# Patient Record
Sex: MALE | Race: WHITE | NOT HISPANIC OR LATINO | Employment: FULL TIME | URBAN - METROPOLITAN AREA
[De-identification: names, ages, dates, MRNs, and addresses within clinical notes are randomized per-mention and may not be internally consistent; named-entity substitution may affect disease eponyms.]

---

## 2019-09-09 ENCOUNTER — ANESTHESIA EVENT (OUTPATIENT)
Dept: PERIOP | Facility: HOSPITAL | Age: 39
DRG: 470 | End: 2019-09-09
Payer: COMMERCIAL

## 2019-09-09 ENCOUNTER — APPOINTMENT (OUTPATIENT)
Dept: LAB | Facility: HOSPITAL | Age: 39
End: 2019-09-09
Attending: ORTHOPAEDIC SURGERY
Payer: COMMERCIAL

## 2019-09-09 ENCOUNTER — APPOINTMENT (OUTPATIENT)
Dept: PREADMISSION TESTING | Facility: HOSPITAL | Age: 39
End: 2019-09-09
Payer: COMMERCIAL

## 2019-09-09 ENCOUNTER — HOSPITAL ENCOUNTER (OUTPATIENT)
Dept: RADIOLOGY | Facility: HOSPITAL | Age: 39
Discharge: HOME | End: 2019-09-09
Attending: ORTHOPAEDIC SURGERY
Payer: COMMERCIAL

## 2019-09-09 ENCOUNTER — HOSPITAL ENCOUNTER (OUTPATIENT)
Dept: NON INVASIVE DIAGNOSTICS | Facility: HOSPITAL | Age: 39
Discharge: HOME | End: 2019-09-09
Attending: ORTHOPAEDIC SURGERY
Payer: COMMERCIAL

## 2019-09-09 ENCOUNTER — TRANSCRIBE ORDERS (OUTPATIENT)
Dept: ADMINISTRATIVE | Facility: HOSPITAL | Age: 39
End: 2019-09-09

## 2019-09-09 DIAGNOSIS — M16.12 PRIMARY OSTEOARTHRITIS OF LEFT HIP: ICD-10-CM

## 2019-09-09 DIAGNOSIS — Z01.818 OTHER SPECIFIED PRE-OPERATIVE EXAMINATION: ICD-10-CM

## 2019-09-09 DIAGNOSIS — M16.12 PRIMARY OSTEOARTHRITIS OF LEFT HIP: Primary | ICD-10-CM

## 2019-09-09 LAB
ALBUMIN SERPL BCP-MCNC: 3.9 G/DL (ref 3.5–5)
ALP SERPL-CCNC: 112 U/L (ref 46–116)
ALT SERPL W P-5'-P-CCNC: 16 U/L (ref 12–78)
ANION GAP SERPL CALCULATED.3IONS-SCNC: 9 MMOL/L (ref 4–13)
AST SERPL W P-5'-P-CCNC: 14 U/L (ref 5–45)
ATRIAL RATE: 66 BPM
BACTERIA UR QL AUTO: ABNORMAL /HPF
BASOPHILS # BLD AUTO: 0.04 THOUSANDS/ΜL (ref 0–0.1)
BASOPHILS NFR BLD AUTO: 1 % (ref 0–1)
BILIRUB SERPL-MCNC: 0.32 MG/DL (ref 0.2–1)
BILIRUB UR QL STRIP: NEGATIVE
BUN SERPL-MCNC: 13 MG/DL (ref 5–25)
CALCIUM SERPL-MCNC: 9.2 MG/DL (ref 8.3–10.1)
CHLORIDE SERPL-SCNC: 103 MMOL/L (ref 100–108)
CLARITY UR: CLEAR
CO2 SERPL-SCNC: 28 MMOL/L (ref 21–32)
COLOR UR: YELLOW
CREAT SERPL-MCNC: 1.15 MG/DL (ref 0.6–1.3)
EOSINOPHIL # BLD AUTO: 0.08 THOUSAND/ΜL (ref 0–0.61)
EOSINOPHIL NFR BLD AUTO: 1 % (ref 0–6)
ERYTHROCYTE [DISTWIDTH] IN BLOOD BY AUTOMATED COUNT: 13.5 % (ref 11.6–15.1)
GFR SERPL CREATININE-BSD FRML MDRD: 79 ML/MIN/1.73SQ M
GLUCOSE P FAST SERPL-MCNC: 88 MG/DL (ref 65–99)
GLUCOSE UR STRIP-MCNC: NEGATIVE MG/DL
HCT VFR BLD AUTO: 43.9 % (ref 36.5–49.3)
HGB BLD-MCNC: 14.4 G/DL (ref 12–17)
HGB UR QL STRIP.AUTO: ABNORMAL
IMM GRANULOCYTES # BLD AUTO: 0.02 THOUSAND/UL (ref 0–0.2)
IMM GRANULOCYTES NFR BLD AUTO: 0 % (ref 0–2)
KETONES UR STRIP-MCNC: NEGATIVE MG/DL
LEUKOCYTE ESTERASE UR QL STRIP: NEGATIVE
LYMPHOCYTES # BLD AUTO: 1.67 THOUSANDS/ΜL (ref 0.6–4.47)
LYMPHOCYTES NFR BLD AUTO: 29 % (ref 14–44)
MCH RBC QN AUTO: 30 PG (ref 26.8–34.3)
MCHC RBC AUTO-ENTMCNC: 32.8 G/DL (ref 31.4–37.4)
MCV RBC AUTO: 92 FL (ref 82–98)
MONOCYTES # BLD AUTO: 0.6 THOUSAND/ΜL (ref 0.17–1.22)
MONOCYTES NFR BLD AUTO: 10 % (ref 4–12)
MUCOUS THREADS UR QL AUTO: ABNORMAL
NEUTROPHILS # BLD AUTO: 3.39 THOUSANDS/ΜL (ref 1.85–7.62)
NEUTS SEG NFR BLD AUTO: 59 % (ref 43–75)
NITRITE UR QL STRIP: NEGATIVE
NON-SQ EPI CELLS URNS QL MICRO: ABNORMAL /HPF
NRBC BLD AUTO-RTO: 0 /100 WBCS
P AXIS: 79 DEGREES
PH UR STRIP.AUTO: 6 [PH]
PLATELET # BLD AUTO: 204 THOUSANDS/UL (ref 149–390)
PMV BLD AUTO: 9.3 FL (ref 8.9–12.7)
POTASSIUM SERPL-SCNC: 4 MMOL/L (ref 3.5–5.3)
PR INTERVAL: 154 MS
PROT SERPL-MCNC: 7.5 G/DL (ref 6.4–8.2)
PROT UR STRIP-MCNC: ABNORMAL MG/DL
QRS AXIS: -44 DEGREES
QRSD INTERVAL: 94 MS
QT INTERVAL: 392 MS
QTC INTERVAL: 410 MS
RBC # BLD AUTO: 4.8 MILLION/UL (ref 3.88–5.62)
RBC #/AREA URNS AUTO: ABNORMAL /HPF
SODIUM SERPL-SCNC: 140 MMOL/L (ref 136–145)
SP GR UR STRIP.AUTO: >=1.03 (ref 1–1.03)
T WAVE AXIS: 58 DEGREES
UROBILINOGEN UR QL STRIP.AUTO: 0.2 E.U./DL
VENTRICULAR RATE: 66 BPM
WBC # BLD AUTO: 5.8 THOUSAND/UL (ref 4.31–10.16)
WBC #/AREA URNS AUTO: ABNORMAL /HPF

## 2019-09-09 PROCEDURE — 36415 COLL VENOUS BLD VENIPUNCTURE: CPT

## 2019-09-09 PROCEDURE — 93010 ELECTROCARDIOGRAM REPORT: CPT | Performed by: INTERNAL MEDICINE

## 2019-09-09 PROCEDURE — 81001 URINALYSIS AUTO W/SCOPE: CPT | Performed by: ORTHOPAEDIC SURGERY

## 2019-09-09 PROCEDURE — 71046 X-RAY EXAM CHEST 2 VIEWS: CPT

## 2019-09-09 PROCEDURE — 93005 ELECTROCARDIOGRAM TRACING: CPT

## 2019-09-09 PROCEDURE — 85025 COMPLETE CBC W/AUTO DIFF WBC: CPT

## 2019-09-09 PROCEDURE — 80053 COMPREHEN METABOLIC PANEL: CPT

## 2019-09-09 RX ORDER — IBUPROFEN 800 MG/1
800 TABLET ORAL EVERY 8 HOURS PRN
COMMUNITY
End: 2019-10-06 | Stop reason: HOSPADM

## 2019-09-09 RX ORDER — SODIUM CHLORIDE 9 MG/ML
125 INJECTION, SOLUTION INTRAVENOUS CONTINUOUS
Status: CANCELLED | OUTPATIENT
Start: 2019-10-04

## 2019-09-09 NOTE — ANESTHESIA PREPROCEDURE EVALUATION
Review of Systems/Medical History  Patient summary reviewed  Chart reviewed      Cardiovascular  Negative cardio ROS    Pulmonary  Smoker cigarette smoker  ,        GI/Hepatic    PUD, Hepatitis C,   Comment: Hep C in remission     Negative  ROS        Endo/Other  Negative endo/other ROS      GYN  Negative gynecology ROS          Hematology   Musculoskeletal    Arthritis     Neurology  Negative neurology ROS      Psychology   Negative psychology ROS              Physical Exam    Airway    Mallampati score: II  TM Distance: >3 FB  Neck ROM: full     Dental   No notable dental hx     Cardiovascular  Comment: Negative ROS, Rhythm: regular, Rate: normal, Cardiovascular exam normal    Pulmonary  Pulmonary exam normal Breath sounds clear to auscultation,     Other Findings        Anesthesia Plan  ASA Score- 2     Anesthesia Type- spinal with ASA Monitors  Additional Monitors:   Airway Plan:         Plan Factors-    Induction-     Postoperative Plan-     Informed Consent- Anesthetic plan and risks discussed with patient

## 2019-09-09 NOTE — PRE-PROCEDURE INSTRUCTIONS
Pre-Surgery Instructions:   Medication Instructions    HYDROcodone-acetaminophen (NORCO) 5-325 mg per tablet Patient was instructed by Physician and understands   ibuprofen (MOTRIN) 800 mg tablet Patient was instructed by Physician and understands  Seen by Dr Fredy Diez  Instructed has no medications to take morning of surgery  No aspiein, NSAIDs, vitamins, or supplements 1 week begors surgery

## 2019-10-04 ENCOUNTER — HOSPITAL ENCOUNTER (OUTPATIENT)
Dept: RADIOLOGY | Facility: HOSPITAL | Age: 39
Setting detail: SURGERY ADMIT
Discharge: HOME | DRG: 470 | End: 2019-10-04
Payer: COMMERCIAL

## 2019-10-04 ENCOUNTER — APPOINTMENT (INPATIENT)
Dept: RADIOLOGY | Facility: HOSPITAL | Age: 39
DRG: 470 | End: 2019-10-04
Payer: COMMERCIAL

## 2019-10-04 ENCOUNTER — ANESTHESIA (OUTPATIENT)
Dept: PERIOP | Facility: HOSPITAL | Age: 39
DRG: 470 | End: 2019-10-04
Payer: COMMERCIAL

## 2019-10-04 ENCOUNTER — HOSPITAL ENCOUNTER (INPATIENT)
Facility: HOSPITAL | Age: 39
LOS: 2 days | Discharge: HOME | DRG: 470 | End: 2019-10-06
Attending: ORTHOPAEDIC SURGERY | Admitting: ORTHOPAEDIC SURGERY
Payer: COMMERCIAL

## 2019-10-04 DIAGNOSIS — M16.12 PRIMARY OSTEOARTHRITIS OF LEFT HIP: Primary | ICD-10-CM

## 2019-10-04 DIAGNOSIS — M16.12 PRIMARY OSTEOARTHRITIS OF LEFT HIP: ICD-10-CM

## 2019-10-04 LAB
ABO GROUP BLD: NORMAL
BLD GP AB SCN SERPL QL: NEGATIVE
RH BLD: NEGATIVE
SPECIMEN EXPIRATION DATE: NORMAL

## 2019-10-04 PROCEDURE — G8988 SELF CARE GOAL STATUS: HCPCS

## 2019-10-04 PROCEDURE — 88304 TISSUE EXAM BY PATHOLOGIST: CPT | Performed by: PATHOLOGY

## 2019-10-04 PROCEDURE — C1776 JOINT DEVICE (IMPLANTABLE): HCPCS | Performed by: ORTHOPAEDIC SURGERY

## 2019-10-04 PROCEDURE — C1713 ANCHOR/SCREW BN/BN,TIS/BN: HCPCS | Performed by: ORTHOPAEDIC SURGERY

## 2019-10-04 PROCEDURE — G8978 MOBILITY CURRENT STATUS: HCPCS

## 2019-10-04 PROCEDURE — 88311 DECALCIFY TISSUE: CPT | Performed by: PATHOLOGY

## 2019-10-04 PROCEDURE — 86850 RBC ANTIBODY SCREEN: CPT | Performed by: ORTHOPAEDIC SURGERY

## 2019-10-04 PROCEDURE — 86901 BLOOD TYPING SEROLOGIC RH(D): CPT | Performed by: ORTHOPAEDIC SURGERY

## 2019-10-04 PROCEDURE — 0SRB0JA REPLACEMENT OF LEFT HIP JOINT WITH SYNTHETIC SUBSTITUTE, UNCEMENTED, OPEN APPROACH: ICD-10-PCS | Performed by: ORTHOPAEDIC SURGERY

## 2019-10-04 PROCEDURE — 73502 X-RAY EXAM HIP UNI 2-3 VIEWS: CPT

## 2019-10-04 PROCEDURE — G8979 MOBILITY GOAL STATUS: HCPCS

## 2019-10-04 PROCEDURE — 97163 PT EVAL HIGH COMPLEX 45 MIN: CPT

## 2019-10-04 PROCEDURE — 86900 BLOOD TYPING SEROLOGIC ABO: CPT | Performed by: ORTHOPAEDIC SURGERY

## 2019-10-04 PROCEDURE — G8987 SELF CARE CURRENT STATUS: HCPCS

## 2019-10-04 PROCEDURE — 97167 OT EVAL HIGH COMPLEX 60 MIN: CPT

## 2019-10-04 PROCEDURE — 73501 X-RAY EXAM HIP UNI 1 VIEW: CPT

## 2019-10-04 DEVICE — BIOLOX DELTA CERAMIC FEMORAL HEAD +5.0 36MM DIA 12/14 TAPER
Type: IMPLANTABLE DEVICE | Site: HIP | Status: FUNCTIONAL
Brand: BIOLOX DELTA

## 2019-10-04 DEVICE — PINNACLE CANCELLOUS BONE SCREW 6.5MM X 20MM
Type: IMPLANTABLE DEVICE | Site: HIP | Status: FUNCTIONAL
Brand: PINNACLE

## 2019-10-04 DEVICE — PINNACLE HIP SOLUTIONS ALTRX POLYETHYLENE ACETABULAR LINER +4 NEUTRAL 36MM ID 60MM OD
Type: IMPLANTABLE DEVICE | Site: HIP | Status: FUNCTIONAL
Brand: PINNACLE ALTRX

## 2019-10-04 DEVICE — PINNACLE POROCOAT ACETABULAR SHELL SECTOR II 60MM OD
Type: IMPLANTABLE DEVICE | Site: HIP | Status: FUNCTIONAL
Brand: PINNACLE POROCOAT

## 2019-10-04 DEVICE — CORAIL HIP SYSTEM CEMENTLESS FEMORAL STEM 12/14 AMT 125 DEGREES KLA SIZE 13 HA COATED HIGH OFFSET COLLAR
Type: IMPLANTABLE DEVICE | Site: HIP | Status: FUNCTIONAL
Brand: CORAIL

## 2019-10-04 RX ORDER — CALCIUM CARBONATE 200(500)MG
1000 TABLET,CHEWABLE ORAL DAILY PRN
Status: DISCONTINUED | OUTPATIENT
Start: 2019-10-04 | End: 2019-10-06 | Stop reason: HOSPADM

## 2019-10-04 RX ORDER — ONDANSETRON 2 MG/ML
INJECTION INTRAMUSCULAR; INTRAVENOUS AS NEEDED
Status: DISCONTINUED | OUTPATIENT
Start: 2019-10-04 | End: 2019-10-04 | Stop reason: SURG

## 2019-10-04 RX ORDER — TRANEXAMIC ACID 100 MG/ML
INJECTION, SOLUTION INTRAVENOUS AS NEEDED
Status: DISCONTINUED | OUTPATIENT
Start: 2019-10-04 | End: 2019-10-04 | Stop reason: SURG

## 2019-10-04 RX ORDER — BUPIVACAINE HYDROCHLORIDE AND EPINEPHRINE 2.5; 5 MG/ML; UG/ML
INJECTION, SOLUTION INFILTRATION; PERINEURAL AS NEEDED
Status: DISCONTINUED | OUTPATIENT
Start: 2019-10-04 | End: 2019-10-04 | Stop reason: HOSPADM

## 2019-10-04 RX ORDER — PROPOFOL 10 MG/ML
INJECTION, EMULSION INTRAVENOUS CONTINUOUS PRN
Status: DISCONTINUED | OUTPATIENT
Start: 2019-10-04 | End: 2019-10-04 | Stop reason: SURG

## 2019-10-04 RX ORDER — MAGNESIUM HYDROXIDE 1200 MG/15ML
LIQUID ORAL AS NEEDED
Status: DISCONTINUED | OUTPATIENT
Start: 2019-10-04 | End: 2019-10-04 | Stop reason: HOSPADM

## 2019-10-04 RX ORDER — SODIUM CHLORIDE 9 MG/ML
125 INJECTION, SOLUTION INTRAVENOUS CONTINUOUS
Status: DISCONTINUED | OUTPATIENT
Start: 2019-10-04 | End: 2019-10-06 | Stop reason: HOSPADM

## 2019-10-04 RX ORDER — SIMETHICONE 80 MG
80 TABLET,CHEWABLE ORAL 4 TIMES DAILY PRN
Status: DISCONTINUED | OUTPATIENT
Start: 2019-10-04 | End: 2019-10-06 | Stop reason: HOSPADM

## 2019-10-04 RX ORDER — HYDROMORPHONE HCL/PF 1 MG/ML
0.2 SYRINGE (ML) INJECTION
Status: DISCONTINUED | OUTPATIENT
Start: 2019-10-04 | End: 2019-10-06 | Stop reason: HOSPADM

## 2019-10-04 RX ORDER — SODIUM CHLORIDE, SODIUM LACTATE, POTASSIUM CHLORIDE, CALCIUM CHLORIDE 600; 310; 30; 20 MG/100ML; MG/100ML; MG/100ML; MG/100ML
125 INJECTION, SOLUTION INTRAVENOUS CONTINUOUS
Status: DISCONTINUED | OUTPATIENT
Start: 2019-10-04 | End: 2019-10-06 | Stop reason: HOSPADM

## 2019-10-04 RX ORDER — CEFAZOLIN SODIUM 1 G/50ML
1000 SOLUTION INTRAVENOUS EVERY 8 HOURS
Status: COMPLETED | OUTPATIENT
Start: 2019-10-04 | End: 2019-10-05

## 2019-10-04 RX ORDER — HYDROMORPHONE HCL/PF 1 MG/ML
0.5 SYRINGE (ML) INJECTION
Status: DISCONTINUED | OUTPATIENT
Start: 2019-10-04 | End: 2019-10-04 | Stop reason: HOSPADM

## 2019-10-04 RX ORDER — OXYCODONE HYDROCHLORIDE 10 MG/1
10 TABLET ORAL EVERY 4 HOURS PRN
Status: DISCONTINUED | OUTPATIENT
Start: 2019-10-04 | End: 2019-10-06 | Stop reason: HOSPADM

## 2019-10-04 RX ORDER — CEFAZOLIN SODIUM 2 G/50ML
2000 SOLUTION INTRAVENOUS ONCE
Status: COMPLETED | OUTPATIENT
Start: 2019-10-04 | End: 2019-10-04

## 2019-10-04 RX ORDER — FENTANYL CITRATE/PF 50 MCG/ML
25 SYRINGE (ML) INJECTION
Status: DISCONTINUED | OUTPATIENT
Start: 2019-10-04 | End: 2019-10-04 | Stop reason: HOSPADM

## 2019-10-04 RX ORDER — FENTANYL CITRATE 50 UG/ML
INJECTION, SOLUTION INTRAMUSCULAR; INTRAVENOUS AS NEEDED
Status: DISCONTINUED | OUTPATIENT
Start: 2019-10-04 | End: 2019-10-04 | Stop reason: SURG

## 2019-10-04 RX ORDER — ASPIRIN 81 MG/1
81 TABLET ORAL 2 TIMES DAILY
Status: DISCONTINUED | OUTPATIENT
Start: 2019-10-04 | End: 2019-10-06 | Stop reason: HOSPADM

## 2019-10-04 RX ORDER — BUPIVACAINE HYDROCHLORIDE 7.5 MG/ML
INJECTION, SOLUTION INTRASPINAL AS NEEDED
Status: DISCONTINUED | OUTPATIENT
Start: 2019-10-04 | End: 2019-10-04 | Stop reason: SURG

## 2019-10-04 RX ORDER — TRAMADOL HYDROCHLORIDE 50 MG/1
50 TABLET ORAL EVERY 6 HOURS PRN
Status: DISCONTINUED | OUTPATIENT
Start: 2019-10-04 | End: 2019-10-06 | Stop reason: HOSPADM

## 2019-10-04 RX ORDER — PANTOPRAZOLE SODIUM 40 MG/1
40 TABLET, DELAYED RELEASE ORAL
Status: DISCONTINUED | OUTPATIENT
Start: 2019-10-05 | End: 2019-10-06 | Stop reason: HOSPADM

## 2019-10-04 RX ORDER — ACETAMINOPHEN 325 MG/1
650 TABLET ORAL EVERY 6 HOURS PRN
Status: DISCONTINUED | OUTPATIENT
Start: 2019-10-04 | End: 2019-10-06 | Stop reason: HOSPADM

## 2019-10-04 RX ORDER — MIDAZOLAM HYDROCHLORIDE 1 MG/ML
INJECTION INTRAMUSCULAR; INTRAVENOUS AS NEEDED
Status: DISCONTINUED | OUTPATIENT
Start: 2019-10-04 | End: 2019-10-04 | Stop reason: SURG

## 2019-10-04 RX ORDER — METHOCARBAMOL 500 MG/1
500 TABLET, FILM COATED ORAL EVERY 6 HOURS PRN
Status: DISCONTINUED | OUTPATIENT
Start: 2019-10-04 | End: 2019-10-06 | Stop reason: HOSPADM

## 2019-10-04 RX ORDER — ONDANSETRON 2 MG/ML
4 INJECTION INTRAMUSCULAR; INTRAVENOUS EVERY 6 HOURS PRN
Status: DISCONTINUED | OUTPATIENT
Start: 2019-10-04 | End: 2019-10-06 | Stop reason: HOSPADM

## 2019-10-04 RX ORDER — ONDANSETRON 2 MG/ML
4 INJECTION INTRAMUSCULAR; INTRAVENOUS ONCE
Status: DISCONTINUED | OUTPATIENT
Start: 2019-10-04 | End: 2019-10-04 | Stop reason: HOSPADM

## 2019-10-04 RX ORDER — OXYCODONE HYDROCHLORIDE 5 MG/1
5 TABLET ORAL EVERY 4 HOURS PRN
Status: DISCONTINUED | OUTPATIENT
Start: 2019-10-04 | End: 2019-10-06 | Stop reason: HOSPADM

## 2019-10-04 RX ORDER — VANCOMYCIN HYDROCHLORIDE 1 G/200ML
1000 INJECTION, SOLUTION INTRAVENOUS ONCE
Status: COMPLETED | OUTPATIENT
Start: 2019-10-04 | End: 2019-10-04

## 2019-10-04 RX ORDER — SENNOSIDES 8.6 MG
1 TABLET ORAL DAILY
Status: DISCONTINUED | OUTPATIENT
Start: 2019-10-04 | End: 2019-10-06 | Stop reason: HOSPADM

## 2019-10-04 RX ADMIN — PROPOFOL 100 MCG/KG/MIN: 10 INJECTION, EMULSION INTRAVENOUS at 10:47

## 2019-10-04 RX ADMIN — SODIUM CHLORIDE, SODIUM LACTATE, POTASSIUM CHLORIDE, AND CALCIUM CHLORIDE 125 ML/HR: .6; .31; .03; .02 INJECTION, SOLUTION INTRAVENOUS at 22:17

## 2019-10-04 RX ADMIN — SODIUM CHLORIDE: 0.9 INJECTION, SOLUTION INTRAVENOUS at 10:50

## 2019-10-04 RX ADMIN — TRANEXAMIC ACID 1 G: 1 INJECTION, SOLUTION INTRAVENOUS at 11:50

## 2019-10-04 RX ADMIN — SODIUM CHLORIDE 125 ML/HR: 0.9 INJECTION, SOLUTION INTRAVENOUS at 09:47

## 2019-10-04 RX ADMIN — OXYCODONE HYDROCHLORIDE 5 MG: 5 TABLET ORAL at 20:05

## 2019-10-04 RX ADMIN — TRAMADOL HYDROCHLORIDE 50 MG: 50 TABLET, FILM COATED ORAL at 16:58

## 2019-10-04 RX ADMIN — CEFAZOLIN SODIUM 2000 MG: 2 SOLUTION INTRAVENOUS at 10:45

## 2019-10-04 RX ADMIN — ASPIRIN 81 MG: 81 TABLET, COATED ORAL at 22:11

## 2019-10-04 RX ADMIN — HYDROMORPHONE HYDROCHLORIDE 0.2 MG: 1 INJECTION, SOLUTION INTRAMUSCULAR; INTRAVENOUS; SUBCUTANEOUS at 15:26

## 2019-10-04 RX ADMIN — TRANEXAMIC ACID 1 G: 1 INJECTION, SOLUTION INTRAVENOUS at 10:36

## 2019-10-04 RX ADMIN — BUPIVACAINE HYDROCHLORIDE IN DEXTROSE 2 ML: 7.5 INJECTION, SOLUTION SUBARACHNOID at 10:33

## 2019-10-04 RX ADMIN — SODIUM CHLORIDE, SODIUM LACTATE, POTASSIUM CHLORIDE, AND CALCIUM CHLORIDE 125 ML/HR: .6; .31; .03; .02 INJECTION, SOLUTION INTRAVENOUS at 14:00

## 2019-10-04 RX ADMIN — ONDANSETRON 4 MG: 2 INJECTION INTRAMUSCULAR; INTRAVENOUS at 11:56

## 2019-10-04 RX ADMIN — MIDAZOLAM 4 MG: 1 INJECTION INTRAMUSCULAR; INTRAVENOUS at 10:24

## 2019-10-04 RX ADMIN — CEFAZOLIN SODIUM 1000 MG: 1 SOLUTION INTRAVENOUS at 18:30

## 2019-10-04 RX ADMIN — METHOCARBAMOL 500 MG: 500 TABLET, FILM COATED ORAL at 16:58

## 2019-10-04 RX ADMIN — VANCOMYCIN HYDROCHLORIDE 1063.75 MG: 1 INJECTION, SOLUTION INTRAVENOUS at 10:36

## 2019-10-04 RX ADMIN — SODIUM CHLORIDE 125 ML/HR: 0.9 INJECTION, SOLUTION INTRAVENOUS at 08:34

## 2019-10-04 RX ADMIN — FENTANYL CITRATE 100 MCG: 50 INJECTION, SOLUTION INTRAMUSCULAR; INTRAVENOUS at 10:24

## 2019-10-04 RX ADMIN — OXYCODONE HYDROCHLORIDE 10 MG: 10 TABLET ORAL at 14:48

## 2019-10-04 RX ADMIN — HYDROMORPHONE HYDROCHLORIDE 0.2 MG: 1 INJECTION, SOLUTION INTRAMUSCULAR; INTRAVENOUS; SUBCUTANEOUS at 21:07

## 2019-10-04 NOTE — INTERVAL H&P NOTE
H&P reviewed  After examining the patient I find no changes in the patients condition since the H&P had been written      Vitals:    09/09/19 0909   BP: 145/90   Pulse: 82   Resp: 14   Temp: 98 3 °F (36 8 °C)

## 2019-10-04 NOTE — ANESTHESIA PROCEDURE NOTES
Spinal Block    Patient location during procedure: OR  Start time: 10/4/2019 10:33 AM  Reason for block: primary anesthetic  Staffing  Anesthesiologist: Hetal Bergman DO  Performed: anesthesiologist   Preanesthetic Checklist  Completed: patient identified, site marked, surgical consent, pre-op evaluation, timeout performed, IV checked, risks and benefits discussed and monitors and equipment checked  Spinal Block  Patient position: sitting  Prep: Betadine  Patient monitoring: heart rate and continuous pulse ox  Approach: midline  Location: L3-4  Injection technique: single-shot  Needle  Needle type: pencil-tip   Needle gauge: 25 G  Needle length: 5 cm  Assessment  Injection Assessment:  positive aspiration for clear CSF    Post-procedure:  site cleaned

## 2019-10-04 NOTE — PLAN OF CARE
Problem: OCCUPATIONAL THERAPY ADULT  Goal: Performs self-care activities at highest level of function for planned discharge setting  See evaluation for individualized goals  Description  Treatment Interventions: ADL retraining, Functional transfer training, UE strengthening/ROM, Endurance training, Patient/family training, Equipment evaluation/education, Compensatory technique education          See flowsheet documentation for full assessment, interventions and recommendations  Note:   Limitation: Decreased ADL status, Decreased Safe judgement during ADL, Decreased endurance  Prognosis: Good  Assessment: Pt is a 40y/o male admitted to the hospital for an elected L THR(10/4)--anterior approach(no restrictions) 2* hx OA  Pt is currently allowed WBAT with his L LE  Pt with PMH multiple orthopedic interventions--R TSR, L TKR, R hip arthroplasty  PTA pt states independence with all aspects of his ADLs, transfers, ambulation--w/o device; neg falls, +, neg home alone  During initial eval, pt demonstrate deficits with his functional balance, functional mobility, ADL status, activity tolerance(currently fair=15-20mins), transfer safety, and b/l UE strength  Pt would benefit from continued OT tx for the above deficits  1-5 OT tx sessions     OT Discharge Recommendation: (outpt P T  )  OT - OK to Discharge:  Yes

## 2019-10-04 NOTE — DISCHARGE INSTRUCTIONS
[facility]   Post Operative Joint Replacement Instructions   Dr Chele Harvey Office [address] [place] [phone]     Remove dressing post op day 7  Home Health care will remove the staples/sutures post op day 14  MEDICATIONS      *  Enteric Coated Aspirin 81 mg:  Start taking this twice daily on discharge and continue for 6 weeks  Continue as you were in the hospital/rehab facility     * Iron: Continue the iron supplement twice daily  If you experience nausea or constipation with the medicine discontinue its use  Contact us if the nausea persists  * Pain Medications: Your prescriptions may run out before you return for your next visit  Please give us two regular office day's notice before you run out, to prevent any problems of not having pain medicine  Be advised that prescriptions for Percocet and OxyContin cannot be phoned to your pharmacy  They will need to be picked up at our office  Please refrain from using alcohol with your pain medicines  Percocet contains 325 mg of Tylenol  You should not exceed 3000 mg of Tylenol in a day  Please be careful to not overdose the Tylenol  * Herbal Medicines: Please hold all these preparations until after your first post-operative visit  Unless specifically directed otherwise  ACTIVITY   * Your weight bearing status is: as tolerated     * If you have been furnished with an assistive device  Please feel free to try to wean from using it under the supervision of your therapist      * You may participate in activity as tolerated  It is likely that you will tire more easily for a time after surgery  Please rest when you need it to help your healing process  * Stairs are not restricted for you  Please climb stairs as instructed by your physical therapist      * For hip and knee replacement patients please elevate your leg or legs while resting  This is important to prevent lower leg swelling       * When you are back at home you will likely have physical therapy three times a week  On the days you do not have formal therapy please perform the home exercises given to you  * If you had a hip replacement, please follow the safety precautions given to you by the nurse or therapist taking care of you  * Immediately following the surgery you are not permitted to drive until cleared by your surgeon  This typically does not happen until your first visit after surgery  * Knee replacement patients may be passengers in a vehicle following their discharge from the hospital      * Hip replacement patients are not allowed in a vehicle, except for your trip home and your first follow up visit  Please follow these guidelines unless specifically instructed to start outpatient therapy at an earlier time  * Please do not perform lifting tasks or strenuous domestic activities  * If you currently are employed, you are off work until cleared by your surgeon  Everyone's ability to return to work is determined by his or her abilities  Your return to work may take a few weeks to a few months  * Sexual activities are permitted as tolerated  NORMAL FINDINGS   * Numbness along the incision     * Mechanical click of a knee replacement  * Your incision area will feel warm  WOUND CARE   * It is OK to shower once there is no spotting or drainage for a full 24 hours  Please do not submerge the incision into a pool, bath or hot tub until after your 1st post-operative visit  * Please do not disturb your staples, sutures, or the scabs  It promotes better healing and smaller scars  * Cover the incision with gauze dressing until there is no further drainage  * You may leave the incision open to the air when there is no discharge left on the gauze at changing time  Once this occurs you may shower  * Please be generous with the use of ice  It is a very good remedy  Apply ice for only twenty minutes at a time   You may use it every hour  It is recommended to ice before and after anticipated activities, which includes exercise and physical therapy  * Wear your knee-high pressure stockings every day  They may be removed for sleep at night  Continue to wear them until you are seen for your first follow up  * For knee and hip replacements; your staples will be removed about 14 days after your surgery date  Home nurses and physical therapists typically remove them  If they are unable to, please call our office to have us do it for you  FOLLOW UP   * You should have a scheduled visit with your surgeon scheduled for three to four weeks after surgery  If you do not remember your appointment date and time, or if you are sure you do not have one, please call to set one up  * Please inform the office if you experience one of the following:    - Fever that is not responding to Tylenol and is above 101 degrees    - Redness of the skin that is increasing in area    - Your incision is soaking the dressings with drainage or blood    - You're unable to bear weight on your operative leg or legs

## 2019-10-04 NOTE — PHYSICAL THERAPY NOTE
Physical Therapy Evaluation    Patient Name: Linda Weeks    TXEVH'X Date: 10/4/2019     Problem List  Active Problems:    * No active hospital problems  *       Past Medical History  Past Medical History:   Diagnosis Date    DDD (degenerative disc disease), lumbar     Disc disorder of lumbar region     History of peptic ulcer     Infectious viral hepatitis     Hep C in "remission since 2004"    Osteonecrosis of multiple sites [facility])         Past Surgical History  Past Surgical History:   Procedure Laterality Date    APPENDECTOMY      HIP ARTHROPLASTY Right     x2    LUMBAR EPIDURAL INJECTION      FL TOTAL KNEE ARTHROPLASTY Left 11/30/2016    Procedure: ARTHROPLASTY KNEE TOTAL;  Surgeon: Javier Thacker MD;  Location: [facility];  Service: Orthopedics    SHOULDER SURGERY Right     total replacement    WISDOM TOOTH EXTRACTION             10/04/19 1511   Note Type   Note type Eval only   Pain Assessment   Pain Assessment 0-10   Pain Score 6   Pain Type Acute pain;Surgical pain   Pain Location Hip   Pain Orientation Left   Hospital Pain Intervention(s) Repositioned; Ambulation/increased activity;Cold applied   Response to Interventions tolerated   Home Living   Type of [address] One level;Stairs to enter with rails  (2 [address])   Bathroom Shower/Tub Tub/shower unit   [employer] Raised   Bathroom Equipment Grab bars around toilet   Home Equipment Walker;Cane;Wheelchair-manual   Prior Function   Level of [place] Independent with ADLs and functional mobility   Lives With [employer] [facility], [place] in the last 6 months 0   Vocational Full time employment   Restrictions/Precautions   Weight Bearing Precautions Per Order Yes   LLE Weight Bearing Per Order WBAT   Other Precautions WBS;THR;Multiple lines; Fall Risk;Pain   General   Family/Caregiver Present Yes  (pt daughter and significant other)   Cognition   Overall Cognitive Status WFL   Arousal/Participation Cooperative   Orientation Level Oriented X4   Following Commands Follows one step commands without difficulty   RUE Assessment   RUE Assessment WFL   LUE Assessment   LUE Assessment WFL   RLE Assessment   RLE Assessment WFL   LLE Assessment   LLE Assessment X   Strength LLE   LLE Overall Strength 2+/5  (grossly)   Coordination   Movements are Fluid and Coordinated 0   Coordination and Movement Description slow movements, guarded posture secondary to pain   Bed Mobility   Supine to Sit 4  Minimal assistance   Additional items Increased time required;Verbal cues;LE management   Additional Comments Pt left sitting in bedside chair at termination of session, all needs within reach   Transfers   Sit to Stand 4  Minimal assistance   Additional items Increased time required;Verbal cues   Stand to Sit 4  Minimal assistance   Additional items Increased time required;Verbal cues   Ambulation/Elevation   Gait pattern Antalgic;Narrow BHUPENDRA; Decreased foot clearance;Decreased L stance; Short stride; Step to;Excessively slow   Gait Assistance 4  Minimal assist   Additional items Verbal cues; Tactile cues   Assistive Device Rolling walker   Distance 5' from bed to bedside chair   Balance   Static Sitting Good   Dynamic Sitting Fair +   Static Standing Fair  (RW)   Dynamic Standing Fair -  (RW)   Ambulatory Fair -  (RW)   Endurance Deficit   Endurance Deficit Yes   Endurance Deficit Description weakness, fatigue, pain   Activity Tolerance   Activity Tolerance Patient limited by fatigue;Patient limited by pain   Medical Staff Made Aware OT, [address]   Nurse Made Aware RN cleared patient for PT evaluation   Assessment   Prognosis Good   Problem List Decreased strength;Decreased range of motion;Decreased endurance; Impaired balance;Decreased mobility; Decreased coordination;Decreased skin integrity;Orthopedic restrictions;Pain   Assessment Pt is a 37 y o  male seen for PT evaluation s/p admit to [facility] on 10/4/19  Two pt identifiers were used to confirm  Pt presented s/p L MATTY which was performed on 10/4/19  Pt was admitted with a primary dx of: primary osteoarthritis of left hip  PT now consulted for assessment of mobility and d/c needs  Pts current co morbidities effecting treatment include: ambulatory dysfunction and personal factors including 2 KAYE home environment  Pt currently lives in an apartment with his family  Pts current clinical presentation is Unstable/ Unpredictable (high complexity) due to Ongoing medical management for primary dx, Increased reliance on more restrictive AD compared to baseline, decreased activity tolerance compared to baseline, fall risk, increased assistance needed from caregiver at current time, continuous pulse oximetry monitoring, multiple lines, decline in overall functional mobility status  Prior to admission, pt was I with ambulation without the use of an AD as per pt  Upon evaluation, pt currently is requiring min A for bed mobility; min A for transfers and min A for ambulation w/ RW  Pt displays decreased step and stride length, decreased gait speed, improper weight shift  Pt presents at PT eval functioning below baseline and currently w/ overall mobility deficits secondary to: decreased strength, decreased endurance, impaired balance, impaired coordination, pain  Pt currently at a fall risk secondary to impairments listed above  Based on PT evaluation, pt will continue to benefit from skilled acute PT interventions to address stated impairments; to maximize functional mobility; for ongoing pt/ family training; and DME needs  At conclusion of PT session pt was left seated in bedside chair, all needs within reach  Provided pt education regarding PT plan to improve functional mobility status  PT is currently recommending home with family support and outpatient PT    Pt agreeable to plan and goals as stated on evaluation  PT will continue to follow during hospital stay  Goals   Patient Goals go home tomorrow   STG Expiration Date 10/18/19   Short Term Goal #1 1  Pt will increased strength by 1 grade in order to increase functional independence with transfers  2  Pt will increase balance grade by 1 in order to improve safety  3  Pt will improve transfer ability to mod I to increase functional independence and decrease caregiver burden  4  Pt will perform bed mobility independently to decrease caregiver burden  5  Pt will be able to amb 150 ft with RW and mod I to increase functional independence in home and community environments  6  Pt will be able to ascend and descend 2 stairs mod I to increase functional independence within the home environment  7  Pt will be independent with HEP  PT Treatment Day 0   Plan   Treatment/Interventions Functional transfer training;LE strengthening/ROM; Elevations; Therapeutic exercise; Endurance training;Patient/family training;Equipment eval/education; Bed mobility;Gait training; Compensatory technique education;Spoke to nursing;OT;Family   PT Frequency 7x/wk; Twice a day   Recommendation   Recommendation Home with family support; Outpatient PT   Equipment Recommended Yoon [place]  (pt reports he owns RW)   PT - OK to Discharge No   Additional Comments increase ambulation distance and clear stairs   Modified [place] Scale   Modified [place] Scale 4   Barthel Index   Feeding 10   Bathing 0   Grooming Score 5   Dressing Score 5   Bladder Score 0   Bowels Score 10   Toilet Use Score 5   Transfers (Bed/Chair) Score 10   Mobility (Level Surface) Score 0   Stairs Score 0   Barthel Index Score 45       Prince Turcios PT, DPT

## 2019-10-04 NOTE — DISCHARGE SUMMARY
DISCHARGE SUMMARY      Patient Name: Elvis Mulligan  Patient MRN: [id number]  Admitting Provider: Poonam Donohue MD  Discharging Provider: Poonam Donohue MD  Primary Care Physician at Discharge: Milagros Garces [place] [phone]  Admission Date: 10/4/2019       Discharge Date: 10/5/19    Admission Diagnosis  Primary osteoarthritis of left hip [M16 12]    Discharge Diagnoses  [facility]  Elvis Mulligan was admitted to [facility] on 10/4/2019, with diagnosis of osteoarthritis in his Left hip  On the day of admission, he was brought to surgery, successfully underwent a Left hip replacement  He tolerated the procedure well  Following surgery, he was taken to the recovery room, at which time, there was a consult placed for Dr Ruby Morales for the patient's medical management  After a short stay in the recovery room, he was transferred to the orthopedic floor at which time, he was resumed on his routine home medications per the hospitalist group  On postop day #1, he was able to start some physical therapy and was able to tolerate it well  At this time, he was in stable condition, showing no sign of deep vein thrombosis or pulmonary embolism  Incision was healing well at the time and showed no signs of infection  DISCHARGE DIAGNOSIS: Primary osteoarthritis of left hip [M16 12]  He is now status post Left total hip replacement, which he underwent during the hospital stay  Medications  See after visit summary for reconciled discharge medications provided to patient and family  Allergies  Allergies   Allergen Reactions    Meloxicam Anaphylaxis       Outpatient Follow-Up  No future appointments      Discharge Disposition  Home GOOD CONDITION    Operative Procedures Performed  Procedure(s):  ARTHROPLASTY HIP TOTAL ANTERIOR        Jay Atkinson PA-C    DISCHARGE SUMMARY

## 2019-10-04 NOTE — CONSULTS
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     Recommendation: -    Patient is hemodynamically stable, continues to recuperate well following the surgery  Continue IV fluids, incentive spirometry, DVT prophylaxis with pharmacotherapy and mechanical compression boots  Reviewed analgesics with him   P r n  Zofran for any nausea vomiting  Discontinue IV fluids and Baldwin catheter within next 24-48 hours  Maintain Intravenous Fluids for next 24 -48 hours, till patient able to reliably keep meals and meds in  Suggest  Zofran ODT 4 mg sublingually PRN for control of post operative nausea  Patient encouraged to  use Incentive spirometry q 15 minutes while awake to minimize risk of postoperative atelectasis and pneumonia  Patient verbalized to understand and fully comprehends  We will follow this pleasant patient with your service closely and recommend necessary changes based on  further hospital course and diagnostics  Thank you, Dr Kristen Olvera , for your kind consultation  HISTORY OF PRESENT ILLNESS:    Reason for Consult:  Post operative management following elective left hip replacement    HPI: Modesto Juarez is a 37 y o  male is a  who has history of having arthralgias and joint pains related to osteonecrosis/Djd for which he has been followed by his primary care physician and by orthopedic team   He had been having increasing pain in his left hip for more than a year which was unamenable to pain medications and conservative management  He was having difficulty working as a     He was noted to have crepitus even with passive range of motion and simple activities were making his left hip pain is worse  His ADLs and quality of life was limited  Patient had previous hip replacement in 2010 followed by revision for loosening in 2012  At that time he was told that he had avascular necrosis  Patient has some family history where he states that his children including his son and daughter have had same issues and they required some bone grafting and were seen by a  at [facility], Dr Yesenia Leavitt, who told him there could be a genetic component but it could not be determined what specific gene it was  Patient also had a left total knee replacement in November of 2016 by Dr Tenzin Gonzalez with excellent relief  Patient began to have pain earlier this year and wanted to get surgery done for his constant throbbing pain  His imaging studies done on 08/05/2019 confirmed severe sclerotic changes in the femoral head consistent with avascular necrosis with arthritic changes inferiorly with flattening  No fractures or dislocations were noted  Patient was given various options and he agreed to proceed with surgery  Currently he is recuperating well  He is having mild pain waxing and waning rated 3/10 on numeric pain scale requiring pain medications  Patient indicates he had history of hepatitis-C for which he was treated and cured in the past about in 2004  Review of Systems       Patient denies any fever chills or rigors  No cough dyspnea plate apnea  No chest pain palpitation syncopes  No abdominal pain nausea vomiting  No focal paresthesias or tingling  Mild pain in his operated left hip area  Denies any history of bleeding  Mood is fair and stable  A complete system-based review of systems as discussed with patient is otherwise negative      PAST MEDICAL HISTORY:  Past Medical History:   Diagnosis Date    DDD (degenerative disc disease), lumbar     Disc disorder of lumbar region     History of peptic ulcer     Infectious viral hepatitis     Hep C in "remission since 2004"    Osteonecrosis of multiple sites [facility])      Past Surgical History:   Procedure Laterality Date    APPENDECTOMY      HIP ARTHROPLASTY Right     x2    LUMBAR EPIDURAL INJECTION      SC TOTAL KNEE ARTHROPLASTY Left 11/30/2016    Procedure: ARTHROPLASTY KNEE TOTAL;  Surgeon: Addison Esquivel MD;  Location: [facility];  Service: Orthopedics    SHOULDER SURGERY Right     total replacement    WISDOM TOOTH EXTRACTION         FAMILY HISTORY:  Non-contributory    SOCIAL HISTORY:  Social History     Substance and Sexual Activity   Alcohol Use Yes    Frequency: Monthly or less    Comment: rarely     Social History     Substance and Sexual Activity   Drug Use Yes    Types: Marijuana    Comment: rare     Social History     Tobacco Use   Smoking Status Current Every Day Smoker    Packs/day: 0 50    Years: 20 00    Pack years: 10 00    Types: Cigarettes   Smokeless Tobacco Never Used       ALLERGIES:  Allergies   Allergen Reactions    Meloxicam Anaphylaxis       MEDICATIONS:  All current active medications have been reviewed    Current Facility-Administered Medications   Medication Dose Route Frequency Provider Last Rate Last Dose    acetaminophen (TYLENOL) tablet 650 mg  650 mg Oral Q6H PRN Tor Nares, PA-C        calcium carbonate (TUMS) chewable tablet 1,000 mg  1,000 mg Oral Daily PRN Tor Nares, PA-C        ceFAZolin (ANCEF) IVPB (premix) 1,000 mg  1,000 mg Intravenous Q8H Soren George PA-C 100 mL/hr at 10/04/19 1830 1,000 mg at 10/04/19 1830    HYDROmorphone (DILAUDID) injection 0 2 mg  0 2 mg Intravenous Q3H PRN Tor Nares, PA-C   0 2 mg at 10/04/19 1526    lactated ringers bolus 1,000 mL  1,000 mL Intravenous Once PRN Tor Nares, PA-C        lactated ringers infusion  125 mL/hr Intravenous Continuous Tor Nares, PA-C 125 mL/hr at 10/04/19 1400 125 mL/hr at 10/04/19 1400    methocarbamol (ROBAXIN) tablet 500 mg  500 mg Oral Q6H PRN Tor Nares, PA-C   500 mg at 10/04/19 1658    ondansetron (ZOFRAN) injection 4 mg  4 mg Intravenous Q6H PRN Dejuan DoneTRE        oxyCODONE (ROXICODONE) immediate release tablet 10 mg  10 mg Oral Q4H PRN Dejuan Palma, PA-C   10 mg at 10/04/19 1448    oxyCODONE (ROXICODONE) IR tablet 5 mg  5 mg Oral Q4H PRN Dejuan Palma PA-C        [START ON 10/5/2019] pantoprazole (PROTONIX) EC tablet 40 mg  40 mg Oral Early Morning Soren George PA-C        senna (SENOKOT) tablet 8 6 mg  1 tablet Oral Daily Soren George PA-C        [facility]) chewable tablet 80 mg  80 mg Oral 4x Daily PRN Soren George PA-C        sodium chloride 0 9 % bolus 1,000 mL  1,000 mL Intravenous Once PRN Soren George PA-C        sodium chloride 0 9 % infusion  125 mL/hr Intravenous Continuous Saundra Solan, DO   Stopped at 10/04/19 1400    traMADol (ULTRAM) tablet 50 mg  50 mg Oral Q6H PRN Dejuan Palma, TRE   50 mg at 10/04/19 1658       Medications Prior to Admission   Medication    HYDROcodone-acetaminophen (NORCO) 5-325 mg per tablet    ibuprofen (MOTRIN) 800 mg tablet       PHYSICAL EXAM:    Vitals:  Temp:  [96 2 °F (35 7 °C)-97 5 °F (36 4 °C)] 96 2 °F (35 7 °C)  HR:  [56-90] 65  Resp:  [12-16] 16  BP: (109-133)/(53-86) 131/84  SpO2:  [98 %-100 %] 100 %  Temp (24hrs), Av 8 °F (36 °C), Min:96 2 °F (35 7 °C), Max:97 5 °F (36 4 °C)  Current: Temperature: (!) 96 2 °F (35 7 °C)  Body mass index is 23 45 kg/m²  Patient is awake and alert cooperative slightly tired appearing  Hydration average, continues on IV fluids  Fully awake and alert cooperative    Lying flat in bed wearing hospital attire  Bilateral lungs are clear no rales or crackles  Regular S1-S2 no murmurs  Abdomen is soft nontender  Neurological intact slight limitation to left hip movement because of immediate surgery  Well-healed surgical scar vitaliy is noted on the left knee area  Bilateral Homans sign is negative  Distal pulses are intact  Skin dry and warm no rashes  Mood fair neutral but somewhat tired appearing      LABS, IMAGING, & OTHER STUDIES:  Lab Results:  I have personally reviewed pertinent labs  Lab Results   Component Value Date     09/09/2019    CO2 28 09/09/2019    BUN 13 09/09/2019    CREATININE 1 15 09/09/2019    EGFR 79 09/09/2019    CALCIUM 9 2 09/09/2019    AST 14 09/09/2019    ALT 16 09/09/2019    ALKPHOS 112 09/09/2019     Lab Results   Component Value Date    WBC 5 80 09/09/2019    WBC 9 93 12/01/2016    WBC 6 35 11/15/2016    HGB 14 4 09/09/2019    HGB 12 8 12/01/2016    HGB 16 1 11/15/2016     09/09/2019     12/01/2016     11/15/2016       Lab Results   Component Value Date    INR 0 99 11/15/2016         Imaging Studies:   I have personally reviewed pertinent imaging study reports  Imaging:     Xr Chest Pa & Lateral    Result Date: 9/9/2019  Narrative: CHEST INDICATION:   M16 12: Unilateral primary osteoarthritis, left hip Z01 818: Encounter for other preprocedural examination  COMPARISON:  11/15/2016 EXAM PERFORMED/VIEWS:  XR CHEST PA & LATERAL  The frontal view was performed utilizing dual energy radiographic technique  Images: 5 FINDINGS: Cardiomediastinal silhouette appears unremarkable  The lungs are clear  No pneumothorax or pleural effusion  Osseous structures appear within normal limits for patient age  Right shoulder arthroplasty only partially visualized  Impression: No acute cardiopulmonary disease  Workstation performed: [id number]     Xr Hip/pelv 1 Vw Left If Performed    Result Date: 10/4/2019  Narrative: LEFT HIP INDICATION:   post op  COMPARISON:  Fluoroscopic images 10/4/2019 VIEWS:  XR HIP/PELV 1 VW LEFT W PELVIS IF PERFORMED FINDINGS: Total hip arthroplasty projects in expected position  No periprosthetic fracture or lucency  No lytic or blastic osseous lesions  Skin staples are in place  There is air within the soft tissues likely related to recent surgery  The visualized lumbar spine is unremarkable  Impression: Unremarkable appearance of left total hip arthroplasty   Workstation performed: [id number]     Xr Hip/pelv 2-3 Vws Left If Performed    Result Date: 10/4/2019  Narrative: C-ARM - INDICATION: M16 12: Unilateral primary osteoarthritis, left hip  Procedure guidance  COMPARISON:  None TECHNIQUE: FLUOROSCOPY TIME:   11 SECS 4 FLUOROSCOPIC IMAGES FINDINGS: Fluoroscopic guidance provided for orthopedic surgical procedure  Osseous and soft tissue detail limited by technique  Impression: Fluoroscopic guidance provided for surgical procedure  Please refer to the separate procedure notes for additional details  Workstation performed: [id number]       EKG, Pathology, and Other Studies:   I have personally reviewed pertinent reports  Portions of the record may have been created with voice recognition software  Occasional wrong word or "sound a like" substitutions may have occurred due to the inherent limitations of voice recognition software  Read the chart carefully and recognize, using context, where substitutions have occurred

## 2019-10-04 NOTE — OCCUPATIONAL THERAPY NOTE
OccupationalTherapy Evaluation(time=6753-1589)     Patient Name: Krishna Newman  MKUTL'I Date: 10/4/2019  Problem List  Active Problems:    * No active hospital problems   *    Past Medical History  Past Medical History:   Diagnosis Date    DDD (degenerative disc disease), lumbar     Disc disorder of lumbar region     History of peptic ulcer     Infectious viral hepatitis     Hep C in "remission since 2004"    Osteonecrosis of multiple sites [facility])      Past Surgical History  Past Surgical History:   Procedure Laterality Date    APPENDECTOMY      HIP ARTHROPLASTY Right     x2    LUMBAR EPIDURAL INJECTION      [place] TOTAL KNEE ARTHROPLASTY Left 11/30/2016    Procedure: ARTHROPLASTY KNEE TOTAL;  Surgeon: Daryn Hogan MD;  Location: [facility];  Service: Orthopedics    SHOULDER SURGERY Right     total replacement    WISDOM TOOTH EXTRACTION               10/04/19 8240   Note Type   Note type Eval only   Restrictions/Precautions   Weight Bearing Precautions Per Order Yes   LLE Weight Bearing Per Order WBAT   Other Precautions Fall Risk;Pain;Multiple lines  (no restrictions)   Pain Assessment   Pain Assessment 0-10   Pain Score 6   Pain Type Acute pain;Surgical pain   Pain Location Hip   Pain Orientation Left   Home Living   Type of Home Apartment   Home Layout One level  (2 debra with railing)   Bathroom Shower/Tub Tub/shower unit   Bathroom Toilet Raised   Bathroom Equipment Grab bars in [facility] Walker;Cane;Wheelchair-manual  (has "hip kit")   Prior Function   Lives With Family   ADL Assistance Independent   Falls in the last 6 months 0   Lifestyle   Autonomy PTA pt states independence with all aspects of his ADLs, transfers, ambulation--w/o device; neg falls, +, neg home alone   Reciprocal Relationships supportive family   Service to Others works as a    Intrinsic Gratification spending time with family   Psychosocial   Psychosocial (WDL) WDL   Subjective   Subjective "I'm no stranger to this stuff "   ADL   Where Assessed Edge of bed   Eating Assistance 6  Modified independent   Grooming Assistance 6  Modified Independent   UB Bathing Assistance 5  Supervision/Setup   LB Bathing Assistance 3  Moderate Assistance   UB Dressing Assistance 5  Supervision/Setup   LB Dressing Assistance 3  Moderate Assistance   Bed Mobility   Supine to Sit 4  Minimal assistance   Additional items Assist x 1; Increased time required;Verbal cues;LE management   Transfers   Sit to Stand 4  Minimal assistance   Additional items Assist x 1; Increased time required;Verbal cues   Stand to Sit 4  Minimal assistance   Additional items Assist x 1; Increased time required;Verbal cues   Additional Comments bp's=133/86(supine), 114/80(EOB), 129/84(after standing)   Functional Mobility   Functional Mobility 4  Minimal assistance   Additional Comments x1   Additional items Rolling walker   Balance   Static Sitting Good   Dynamic Sitting Fair +   Static Standing Fair   Dynamic Standing Fair -   Activity Tolerance   Activity Tolerance Patient tolerated treatment well   Medical Staff Made Aware nsg, P T     RUE Assessment   RUE Assessment WFL   RUE Strength   RUE Overall Strength Within Functional Limits - able to perform ADL tasks with strength  (4+/5 throughout)   LUE Assessment   LUE Assessment WFL   LUE Strength   LUE Overall Strength Within Functional Limits - able to perform ADL tasks with strength  (4+/5 throughout)   Hand Function   Gross Motor Coordination Functional   Fine Motor Coordination Functional   Sensation   Light Touch No apparent deficits   Proprioception   Proprioception No apparent deficits   Vision-Basic Assessment   Current Vision   (glasses)   Vision - Complex Assessment   Acuity Able to read clock/calendar on wall without difficulty   Perception   Inattention/Neglect Appears intact   Cognition   Overall Cognitive Status [facility]   Arousal/Participation Alert   Attention Within functional limits   Orientation Level Oriented X4   Memory Within functional limits   Following Commands Follows all commands and directions without difficulty   Assessment   Limitation Decreased ADL status; Decreased Safe judgement during ADL;Decreased endurance   Prognosis Good   Assessment Pt is a 40y/o male admitted to the hospital for an elected L THR(10/4)--anterior approach(no restrictions) 2* hx OA  Pt is currently allowed WBAT with his L LE  Pt with PMH multiple orthopedic interventions--R TSR, L TKR, R hip arthroplasty  PTA pt states independence with all aspects of his ADLs, transfers, ambulation--w/o device; neg falls, +, neg home alone  During initial eval, pt demonstrate deficits with his functional balance, functional mobility, ADL status, activity tolerance(currently fair=15-20mins), transfer safety, and b/l UE strength  Pt would benefit from continued OT tx for the above deficits  1-5 OT tx sessions   Goals   Patient Goals "to be able to go home "   STG Time Frame   (1-7)   Short Term Goal #1 Pt will demonstrate improved activity tolerance to good(20-30mins) and standing tolerance to 3-5mins to assist with ADLs  Short Term Goal #2 Pt will demonstrate mod I with their bed mobility to facilitate EOB ADLs  Short Term Goal  Pt will demonstrate mod I with their sit-stand transfers to assist with completion of their LE dressing  Long Term Goal #1 Pt will demonstrate proper walker/transfer safety 100% of the time  Long Term Goal #2 Pt will demonstrate g/g- balance with all functional activities  Long Term Goal Pt will demonstrate mod I with their UE and LE bathing/dresssing  Plan   Treatment Interventions ADL retraining;Functional transfer training;UE strengthening/ROM; Endurance training;Patient/family training;Equipment evaluation/education; Compensatory technique education   Goal Expiration Date 10/11/19   OT Treatment Day 0   OT Frequency 3-5x/wk   Recommendation   OT Discharge Recommendation   (outpt P T  )   OT - OK to Discharge Yes   Barthel Index   Feeding 10   Bathing 0   Grooming Score 5   Dressing Score 5   Bladder Score 0   Bowels Score 10   Toilet Use Score 5   Transfers (Bed/Chair) Score 10   Mobility (Level Surface) Score 0   Stairs Score 0   Barthel Index Score 45   Bo Richards, OT

## 2019-10-04 NOTE — PLAN OF CARE
Problem: PHYSICAL THERAPY ADULT  Goal: Performs mobility at highest level of function for planned discharge setting  See evaluation for individualized goals  Description  Treatment/Interventions: Functional transfer training, LE strengthening/ROM, Elevations, Therapeutic exercise, Endurance training, Patient/family training, Equipment eval/education, Bed mobility, Gait training, Compensatory technique education, Spoke to nursing, OT, Family  Equipment Recommended: Walker(pt reports he owns RW)       See flowsheet documentation for full assessment, interventions and recommendations  Note:   Prognosis: Good  Problem List: Decreased strength, Decreased range of motion, Decreased endurance, Impaired balance, Decreased mobility, Decreased coordination, Decreased skin integrity, Orthopedic restrictions, Pain  Assessment: Pt is a 37 y o  male seen for PT evaluation s/p admit to [facility] on 10/4/19  Two pt identifiers were used to confirm  Pt presented s/p L MATTY which was performed on 10/4/19  Pt was admitted with a primary dx of: primary osteoarthritis of left hip  PT now consulted for assessment of mobility and d/c needs  Pts current co morbidities effecting treatment include: ambulatory dysfunction and personal factors including 2 KAYE home environment  Pt currently lives in an apartment with his family  Pts current clinical presentation is Unstable/ Unpredictable (high complexity) due to Ongoing medical management for primary dx, Increased reliance on more restrictive AD compared to baseline, decreased activity tolerance compared to baseline, fall risk, increased assistance needed from caregiver at current time, continuous pulse oximetry monitoring, multiple lines, decline in overall functional mobility status  Prior to admission, pt was I with ambulation without the use of an AD as per pt   Upon evaluation, pt currently is requiring min A for bed mobility; min A for transfers and min A for ambulation w/ RW  Pt displays decreased step and stride length, decreased gait speed, improper weight shift  Pt presents at PT eval functioning below baseline and currently w/ overall mobility deficits secondary to: decreased strength, decreased endurance, impaired balance, impaired coordination, pain  Pt currently at a fall risk secondary to impairments listed above  Based on PT evaluation, pt will continue to benefit from skilled acute PT interventions to address stated impairments; to maximize functional mobility; for ongoing pt/ family training; and DME needs  At conclusion of PT session pt was left seated in bedside chair, all needs within reach  Provided pt education regarding PT plan to improve functional mobility status  PT is currently recommending home with family support and outpatient PT  Pt agreeable to plan and goals as stated on evaluation  PT will continue to follow during hospital stay  Recommendation: Home with family support, Outpatient PT     PT - OK to Discharge: No    See flowsheet documentation for full assessment

## 2019-10-05 LAB
ANION GAP SERPL CALCULATED.3IONS-SCNC: 5 MMOL/L (ref 4–13)
BUN SERPL-MCNC: 10 MG/DL (ref 5–25)
CALCIUM SERPL-MCNC: 8.7 MG/DL (ref 8.3–10.1)
CHLORIDE SERPL-SCNC: 103 MMOL/L (ref 100–108)
CO2 SERPL-SCNC: 30 MMOL/L (ref 21–32)
CREAT SERPL-MCNC: 0.99 MG/DL (ref 0.6–1.3)
GFR SERPL CREATININE-BSD FRML MDRD: 94 ML/MIN/1.73SQ M
GLUCOSE SERPL-MCNC: 118 MG/DL (ref 65–140)
HCT VFR BLD AUTO: 40.6 % (ref 36.5–49.3)
HGB BLD-MCNC: 13.3 G/DL (ref 12–17)
POTASSIUM SERPL-SCNC: 3.8 MMOL/L (ref 3.5–5.3)
SODIUM SERPL-SCNC: 138 MMOL/L (ref 136–145)

## 2019-10-05 PROCEDURE — 80048 BASIC METABOLIC PNL TOTAL CA: CPT | Performed by: PHYSICIAN ASSISTANT

## 2019-10-05 PROCEDURE — 85018 HEMOGLOBIN: CPT | Performed by: PHYSICIAN ASSISTANT

## 2019-10-05 PROCEDURE — 97110 THERAPEUTIC EXERCISES: CPT

## 2019-10-05 PROCEDURE — 97116 GAIT TRAINING THERAPY: CPT

## 2019-10-05 PROCEDURE — 85014 HEMATOCRIT: CPT | Performed by: PHYSICIAN ASSISTANT

## 2019-10-05 PROCEDURE — 97530 THERAPEUTIC ACTIVITIES: CPT

## 2019-10-05 RX ORDER — OXYCODONE HYDROCHLORIDE 5 MG/1
5-10 TABLET ORAL EVERY 6 HOURS PRN
Qty: 60 TABLET | Refills: 0 | Status: SHIPPED | OUTPATIENT
Start: 2019-10-05 | End: 2019-10-15

## 2019-10-05 RX ORDER — METHOCARBAMOL 500 MG/1
500 TABLET, FILM COATED ORAL EVERY 6 HOURS PRN
Qty: 40 TABLET | Refills: 0 | Status: SHIPPED | OUTPATIENT
Start: 2019-10-05

## 2019-10-05 RX ORDER — ASPIRIN 81 MG/1
81 TABLET ORAL 2 TIMES DAILY
Qty: 90 TABLET | Refills: 0 | Status: SHIPPED | OUTPATIENT
Start: 2019-10-05

## 2019-10-05 RX ORDER — TRAMADOL HYDROCHLORIDE 50 MG/1
50 TABLET ORAL EVERY 6 HOURS PRN
Qty: 40 TABLET | Refills: 0 | Status: SHIPPED | OUTPATIENT
Start: 2019-10-05 | End: 2019-10-15

## 2019-10-05 RX ADMIN — OXYCODONE HYDROCHLORIDE 5 MG: 5 TABLET ORAL at 06:09

## 2019-10-05 RX ADMIN — ASPIRIN 81 MG: 81 TABLET, COATED ORAL at 10:12

## 2019-10-05 RX ADMIN — ASPIRIN 81 MG: 81 TABLET, COATED ORAL at 17:44

## 2019-10-05 RX ADMIN — METHOCARBAMOL 500 MG: 500 TABLET, FILM COATED ORAL at 19:21

## 2019-10-05 RX ADMIN — METHOCARBAMOL 500 MG: 500 TABLET, FILM COATED ORAL at 07:35

## 2019-10-05 RX ADMIN — CEFAZOLIN SODIUM 1000 MG: 1 SOLUTION INTRAVENOUS at 03:22

## 2019-10-05 RX ADMIN — SENNOSIDES 8.6 MG: 8.6 TABLET, FILM COATED ORAL at 10:12

## 2019-10-05 RX ADMIN — PANTOPRAZOLE SODIUM 40 MG: 40 TABLET, DELAYED RELEASE ORAL at 06:09

## 2019-10-05 RX ADMIN — METHOCARBAMOL 500 MG: 500 TABLET, FILM COATED ORAL at 13:09

## 2019-10-05 RX ADMIN — OXYCODONE HYDROCHLORIDE 5 MG: 5 TABLET ORAL at 17:44

## 2019-10-05 RX ADMIN — OXYCODONE HYDROCHLORIDE 5 MG: 5 TABLET ORAL at 01:49

## 2019-10-05 RX ADMIN — OXYCODONE HYDROCHLORIDE 10 MG: 10 TABLET ORAL at 10:12

## 2019-10-05 NOTE — PHYSICAL THERAPY NOTE
Physical Therapy Treatment Note       10/05/19 9390   Pain Assessment   Pain Assessment 0-10   Pain Score 7   Pain Type Surgical pain   Pain Location Hip  (anterior L thigh)   Pain Orientation Left   Pain Descriptors Spasm   Pain Frequency Constant/continuous   Pain Onset Ongoing   Hospital Pain Intervention(s) Repositioned; Ambulation/increased activity; Emotional support; Rest   Response to Interventions tolerated   Restrictions/Precautions   LLE Weight Bearing Per Order WBAT   Other Precautions WBS; Fall Risk;Pain   Subjective   Subjective Pt out of bed in cahir, reporting spasms in L thigh  Pt agreeable to PT    Bed Mobility   Sit to Supine 4  Minimal assistance   Additional items Assist x 1; Increased time required;Verbal cues;LE management;Leg   (use of sheet to assist inlifting L le into bed  )   Transfers   Sit to Stand 4  Minimal assistance   Additional items Assist x 1; Armrests; Increased time required;Verbal cues   Stand to Sit 4  Minimal assistance   Additional items Assist x 1; Armrests; Increased time required;Verbal cues   Ambulation/Elevation   Gait pattern Improper Weight shift; Forward Flexion;Decreased foot clearance; Short stride; Step to;Excessively slow; Inconsistent niki   Gait Assistance 4  Minimal assist   Additional items Assist x 1;Verbal cues   Assistive Device Rolling walker   Distance 15' x1   Balance   Static Sitting Good   Static Standing Fair -   Dynamic Standing Poor +   Ambulatory Poor +   Endurance Deficit   Endurance Deficit Yes   Endurance Deficit Description pain, fatigue, weakness   Activity Tolerance   Activity Tolerance Patient limited by pain   Medical Staff Made Aware Mavis BULL   Nurse Made Aware yes   Exercises   Heelslides Sitting;10 reps;AAROM;AROM; Right   Glute Sets Sitting;10 reps;AROM; Bilateral   Knee AROM Long Arc Quad Sitting;10 reps;AROM; Right;AAROM   Ankle Pumps Sitting;AROM; Bilateral;10 reps   Assessment   Prognosis Good   Problem List Decreased strength;Decreased range of motion;Decreased endurance; Impaired balance;Decreased mobility; Decreased skin integrity;Pain   Assessment PT seen for progression of mobility  Pt continues to requires min assist for transfers cues for handplacement, advancement and positioning of L le prior to sitting and cues for improved posture  Pt progressed ambulation distances to 15' x1 with min assist   NOted improved ability to advance L le NO physical assist required this session  Pt demonstrates gait deviations as noted no gross lob noted  Increased effort required for all aspects of mobility  Pt continued to report increased pain with all mobility and muscle spasms of l anterior thigh  Pt performs aa-arom to l le for Thr while seated in bedside chair x 10 reps  Pt requires min assist to lift L le into bed with use of sheet  As leg  and verbal cues for bed mobility techniques and increased time required to perform and complete all aspects of mobility  Pt declined ice to L hip  Pt's nurse Oscar Forde notified of muscle spasms in l thigh  SCD's applied to b/l le's  Recommend continued inpt PT for bed mobility, transfers, gait and stair training, L le strengthening/ ROm for THR   Will focus on bed mobility, transfers and ambualtion next 1-2 sessions   Anticipate pt to progress well once pain controlled and be able to d/c to home with family support and OPPT  Goals   Patient Goals " to get back to doing the things I did before "     STG Expiration Date 10/18/19   PT Treatment Day 3   Plan   Treatment/Interventions Functional transfer training;LE strengthening/ROM; Therapeutic exercise; Endurance training;Patient/family training;Equipment eval/education; Bed mobility;Gait training;Spoke to nursing   Progress Slow progress, decreased activity tolerance   PT Frequency 7x/wk; Twice a day   Recommendation   Recommendation Outpatient PT; Home with family support   PT - OK to Discharge No  (needs further mobility and stair training  )        10/05/19 8121   Pain Assessment   Pain Assessment 0-10   Pain Score 7   Pain Type Surgical pain   Pain Location Hip  (anterior L thigh)   Pain Orientation Left   Pain Descriptors Spasm   Pain Frequency Constant/continuous   Pain Onset Ongoing   Hospital Pain Intervention(s) Repositioned; Ambulation/increased activity; Emotional support; Rest   Response to Interventions tolerated   Restrictions/Precautions   LLE Weight Bearing Per Order WBAT   Other Precautions WBS; Fall Risk;Pain   Subjective   Subjective Pt out of bed in cahir, reporting spasms in L thigh  Pt agreeable to PT    Bed Mobility   Sit to Supine 4  Minimal assistance   Additional items Assist x 1; Increased time required;Verbal cues;LE management;Leg   (use of sheet to assist inlifting L le into bed  )   Transfers   Sit to Stand 4  Minimal assistance   Additional items Assist x 1; Armrests; Increased time required;Verbal cues   Stand to Sit 4  Minimal assistance   Additional items Assist x 1; Armrests; Increased time required;Verbal cues   Ambulation/Elevation   Gait pattern Improper Weight shift; Forward Flexion;Decreased foot clearance; Short stride; Step to;Excessively slow; Inconsistent niki   Gait Assistance 4  Minimal assist   Additional items Assist x 1;Verbal cues   Assistive Device Rolling walker   Distance 15' x1   Balance   Static Sitting Good   Static Standing Fair -   Dynamic Standing Poor +   Ambulatory Poor +   Endurance Deficit   Endurance Deficit Yes   Endurance Deficit Description pain, fatigue, weakness   Activity Tolerance   Activity Tolerance Patient limited by pain   Medical Staff Made Aware Mavis BULL   Nurse Made Aware yes   Exercises   Heelslides Sitting;10 reps;AAROM;AROM; Right   Glute Sets Sitting;10 reps;AROM; Bilateral   Knee AROM Long Arc Quad Sitting;10 reps;AROM; Right;AAROM   Ankle Pumps Sitting;AROM; Bilateral;10 reps   Assessment   Prognosis Good   Problem List Decreased strength;Decreased range of motion;Decreased endurance; Impaired balance;Decreased mobility; Decreased skin integrity;Pain   Assessment PT seen for progression of mobility  Pt continues to requires min assist for transfers cues for handplacement, advancement and positioning of L le prior to sitting and cues for improved posture  Pt progressed ambulation distances to 15' x1 with min assist   NOted improved ability to advance L le NO physical assist required this session  Pt demonstrates gait deviations as noted no gross lob noted  Increased effort required for all aspects of mobility  Pt continued to report increased pain with all mobility and muscle spasms of l anterior thigh  Pt performs aa-arom to l le for Thr while seated in bedside chair x 10 reps  Pt requires min assist to lift L le into bed with use of sheet  As leg  and verbal cues for bed mobility techniques and increased time required to perform and complete all aspects of mobility  Pt declined ice to L hip  Pt's nurse Kasie Ramirez notified of muscle spasms in l thigh  SCD's applied to b/l le's  Recommend continued inpt PT for bed mobility, transfers, gait and stair training, L le strengthening/ ROm for THR   Will focus on bed mobility, transfers and ambualtion next 1-2 sessions   Anticipate pt to progress well once pain controlled and be able to d/c to home with family support and OPPT  Goals   Patient Goals " to get back to doing the things I did before "     STG Expiration Date 10/18/19   PT Treatment Day 3   Plan   Treatment/Interventions Functional transfer training;LE strengthening/ROM; Therapeutic exercise; Endurance training;Patient/family training;Equipment eval/education; Bed mobility;Gait training;Spoke to nursing   Progress Slow progress, decreased activity tolerance   PT Frequency 7x/wk; Twice a day   Recommendation   Recommendation Outpatient PT; Home with family support   PT - OK to Discharge No  (needs further mobility and stair training  )       Roderick Arana [address] [place] [address]

## 2019-10-05 NOTE — PHYSICAL THERAPY NOTE
Physical Therapy Treatment Note     10/05/19 1019   Pain Assessment   Pain Assessment 0-10   Pain Score 8   Pain Type Acute pain;Surgical pain   Pain Location Hip   Pain Orientation Left   Pain Descriptors Spasm   Pain Frequency Constant/continuous   Pain Onset Ongoing   Clinical Progression Gradually worsening   Hospital Pain Intervention(s) Repositioned;Cold applied; Ambulation/increased activity; Emotional support   Response to Interventions tolerated poor   Restrictions/Precautions   LLE Weight Bearing Per Order WBAT   Other Precautions WBS; Fall Risk;Pain;THR  (anterior THR)   General   Chart Reviewed Yes   Family/Caregiver Present No   Cognition   Overall Cognitive Status WFL   Arousal/Participation Alert   Following Commands Follows one step commands with increased time or repetition   Subjective   Subjective Pt reporting increaed L hip pain and muscle spasms   Bed Mobility   Supine to Sit 3  Moderate assistance   Additional items Assist x 1;Bedrails; Increased time required;Verbal cues;LE management  (attempted use of sheet to lift and manuver L le)   Transfers   Additional Comments /89 HR 79 and spo2 100% on RA   Endurance Deficit   Endurance Deficit Yes   Endurance Deficit Description pain, weakness, fatigue   Activity Tolerance   Activity Tolerance Patient limited by pain   Medical Staff Made Aware mj BULL   Exercises   THR Supine;10 reps;AAROM;AROM; Right  (a/a hip abd /add , hs, saq, active ap, qs, gs)   Equipment Use   Comments Pt issued written supine THr HEp, reviewed and performed with pt  Discussed goals and PT POC  Assessment   Prognosis Good   Problem List Decreased strength;Decreased range of motion;Decreased endurance; Impaired balance;Decreased mobility; Decreased skin integrity;Pain   Assessment PT seen for progression of PT  PT issued written supine THR HEP pt performed a-aarom To L le x 10 reps  Pt  Requires assistance to perform heel slides, saq and, hip abd /add    Much effort and increased time put forth by pt to perform arom exercises  Pt guarded with arom exercises  Breath holding noted verbal cues for proper breathing techniques required  Attempted supine to sit,with use of sheet to lift and manuver L le out of bed, however pt experiencing increased pain in L hip and requiring much effort and time to perform with assistance  Pt unable to perform due to increased fatigue and pain and requesting pain medicine at this time  Pt will benefit from skilled inpt PT for mobility training and progression of mobility  Will focus on bed mobility, transfers and ambualtion next 1-2 sessions  Anticipate pt to progress well once pain controlled and be able to d/c to home with family support and OPPT  Goals   Patient Goals " For pain to lessen "     STG Expiration Date 10/18/19   PT Treatment Day 1   Plan   Treatment/Interventions LE strengthening/ROM; Therapeutic exercise; Endurance training;Patient/family training;Equipment eval/education; Bed mobility;Spoke to nursing   Progress Slow progress, decreased activity tolerance   PT Frequency 7x/wk; Twice a day   Recommendation   Recommendation Home with family support; Outpatient PT   PT - OK to Discharge No         Celester Odette, PTA

## 2019-10-05 NOTE — UTILIZATION REVIEW
Initial Clinical Review    Elective IP surgical procedure  Age/Sex: 37 y o  male  Surgery Date: 10/4/2019     Procedure: Left Hip Replacement    Anesthesia: Spinal    10/5:  POD#1 Written for Discharge    Admission Orders: Date/Time/Statement: Inpatient Admission Orders (From admission, onward)     Ordered        10/04/19 1242  Inpatient Admission  Once                   Orders Placed This Encounter   Procedures    Inpatient Admission     Standing Status:   Standing     Number of Occurrences:   1     Order Specific Question:   Admitting Physician     Answer:   Radha Salamanca [[id number]]     Order Specific Question:   Level of Care     Answer:   Med Surg [[id number]]     Order Specific Question:   Estimated length of stay     Answer:   Inpatient Only Surgery     Vital Signs: /93 (BP Location: Right arm)   Pulse 77   Temp 98 1 °F (36 7 °C) (Tympanic)   Resp 18   Ht 6' 3" (1 905 m)   Wt 85 1 kg (187 lb 9 6 oz)   SpO2 100%   BMI 23 45 kg/m²       Hemoglobin and hematocrit, blood    Ref Range & Units 10/5/19 0519   Hemoglobin 12 0 - 17 0 g/dL 13 3    Hematocrit 36 5 - 49 3 % 40 6       Specimen Collected: 10/05/19 05:19 Last Resulted: 10/05/19 05:47             Diet: Regular     Mobility: WBAT, Ambulate q shift   DVT Prophylaxis: SCD's    Medications/Pain Control:   Current Facility-Administered Medications:  acetaminophen 650 mg Oral Q6H PRN    aspirin 81 mg Oral BID    calcium carbonate 1,000 mg Oral Daily PRN    HYDROmorphone 0 2 mg Intravenous Q3H PRN    lactated ringers 125 mL/hr Intravenous Continuous    methocarbamol 500 mg Oral Q6H PRN X 1 10/4  &  X 1 10/5   ondansetron 4 mg Intravenous Q6H PRN    oxyCODONE 10 mg Oral Q4H PRN    oxyCODONE 5 mg Oral Q4H PRN X 1 10/4  &  X 2  10/5   pantoprazole 40 mg Oral Early Morning    senna 1 tablet Oral Daily    simethicone 80 mg Oral 4x Daily PRN    traMADol 50 mg Oral Q6H PRN X 1  10/4       Network Utilization Review Department  Phone: [phone];  Fax [phone]  [email or web address]  ATTENTION: Please call with any questions or concerns to [phone]  and carefully listen to the prompts so that you are directed to the right person  Send all requests for admission clinical reviews, approved or denied determinations and any other requests to fax [phone]   All voicemails are confidential

## 2019-10-05 NOTE — OP NOTE
OPERATIVE REPORT  PATIENT NAME: Idalia Oconnor    :  1980  MRN: [id number]  Pt Location: [facility] OR ROOM 02    SURGERY DATE: 10/4/2019    Operative Note    Idalia Oconnor  10/4/2019    Pre-op Diagnosis:   Left Hip Osteoarthritis and Avascular Necrosis    Post-op Diagnosis:   Left Hip Osteoarthritis and Avascular Necrosis    Procedure:  Left Hip Replacement    Surgeon:  Carmen Oliver MD    Assistants:  TRE Fritz CFA       Anesthesia:  Spinal    Estimated Blood Loss:  250 cc    Components:   Implant Name Type Inv  Item Serial No   Lot No  LRB No  Used   SHELL ACETABULAR 60MM POROUS SOLID LCK RING PINNACLE - [id number]  SHELL ACETABULAR 60MM POROUS SOLID LCK RING PINNACLE  DEPUY [id number] Left 1   SCREW GAYLE 6 5 X 20MM SLF TAP PINNACLE - [id number]  SCREW GAYLE 6 5 X 20MM SLF TAP PINNACLE  DEPUY [id number] Left 1   LINER ACTB ULT LNK PE 36 X 60MM +4 NEUTRAL ALTRX - [id number]  LINER ACTB ULT LNK PE 36 X 60MM +4 NEUTRAL ALTRX  DEPUY [id number] Left 1   STEM FEMORAL 12/14 TAPER 125 DEG SZ 13 CORAIL - [id number]  STEM FEMORAL 12/14 TAPER 125 DEG SZ 13 CORAIL  DEPUY [id number] Left 1   HEAD FEMORAL CMNTLS 12/14 TPR 36MM +5MM BIOLOX DELTA ARTICULEZE - [id number]  HEAD FEMORAL CMNTLS 12/14 TPR 36MM +5MM BIOLOX DELTA ARTICULEZE  DEPUY [id number] Left 1       APPROACH:   Direct anterior  INDICATION: Idalia Oconnor is a 37 y o  male with advanced osteoarthritis of the Left hip, which was refractory to nonoperative therapy  He understood the risks and benefits of hip replacement and wished to proceed  DESCRIPTION OF PROCEDURE: On 10/4/2019 the patient was brought to the holding area  The identity and surgical site were confirmed by him and the surgeon  Perioperative intravenous antibiotics were given  Anesthesia was administered by the anesthesia team  The leg length examination was performed with him in the supine position after anesthesia    The operative extremity was about equal with the other side  Then the patient was positioned on the HANA table and preoperative fluoroscopic views were obtained  Then the Left extremity was prepped and draped in the usual sterile manner  A direct anterior approach was performed to the hip in the usual manner, exposing the capsule and doing a capsulotomy  A femoral neck osteotomy was made  The head was removed  Acetabulum was exposed and reamed sequentially and eventually underreamed by 1mm for the impaction of the appropriately sized cup with excellent stability to host bone  This was impacted under direct fluoroscopic guidance to make sure we were satisfied with the position  Also, there was good coverage anteriorly to prevent any impingement  One screw was placed in the posterior-superior quadrant  Osteophytes were removed circumferentially from the acetabulum  Then the polyethylene liner was impacted into the cup and it was down all the way around  Attention was then turned to the femur  Appropriate releases were made  The leg was dropped into hyperextension, external rotation  The canal was accessed without difficulty  It was broached to fit the appropriate size stem, which gave excellent stability within the host bone, both axially and rotationally  We used the reamers distally to prevent any potting  We used the calcar planer as well until we were satisfied with the position of the broach relative to the proximal femur  We trialed with the different neck and head options  We were able to achieve good stability and good restoration of leg lengths and the stem size and neck were chosen  Therefore, trial components were removed  The femoral canal was irrigated and the stem was impacted until it reached a firm endpoint, and it had excellent stability within the host bone, both axially and rotationally   We trialed with the different ball options and the 5 ball was chosen based on stabililty, measurement of leg lengths by fluoroscopic views, and the soft tissue tension to be appropriate and not excessively lax or tight  With that, there was excellent stability as the hip was able to come to 120 degrees of external rotation, in neutral, it was able to come to 100 degrees of external rotation, 30 degrees of hyperextension, had a firm endpoint posteriorly and the soft tissue tension was appropriate without being excessively lax or tight  Also, the leg length restoration appeared to be appropriate given the fluoroscopic measurements and the preoperative leg length examination  The 1 5 ball was lax with reduction and dislocation maneuvers and there was instability in hyperextension and ER, which was not deemed acceptable  Therefore, the 5 ball was chosen and impacted with appropriate force onto a clean trunnion with 3 blows of the mallet  The hip was then reduced  It was irrigated copiously with several liters of pulsed lavage  A periarticular injection was injected for postoperative analgesia  The capsulotomy and the fascia were closed with #1 Vicryl suture  The skin was closed using Vicryl sutures, staples, skin adhesive and a sterile dressing  The patient was then awakened and taken to the recovery room  The assistance of Héctor Echevarria was essential as no qualified resident assistance was available  XRAY REPORT: Postoperative x-rays including an AP view of the Left hip as well as fluoroscopic views of the Left hip taken intraoperatively on 10/4/2019 were checked and revealed a Left hip replacement in good alignment with no fracture or dislocation         César Moore MD     Date: 10/4/2019  Time: 10:12 PM

## 2019-10-05 NOTE — PROGRESS NOTES
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  Postoperative hemoglobin 13 3    Recommendation:    Continue OT/PT and early mobilization  Discussed with patient regarding incentive spirometry to minimize postoperative atelectasis  DVT prophylaxis with mechanical compression boots and 81 mg b i d  Aspirin  Reviewed with patient regarding his poorly controlled pain  Will change Robaxin to round the clock with maintenance therapy with Tylenol  Any breakthroughs can be addressed with p r n  Oxycodone is 5 mg or 10 mg with intermittent tramadol/IV Dilaudid as needed      Subjective:     Patient seen and examined today  EMR and overnight events reviewed  Patient was sleeping easily aroused  He is having mild to moderate pain rated 3/10 on numeric pain scale  This is well located on his operated left hip area  There is no bleeding discharge or drainage  He participated with therapy team but now is trying to catch up on his sleep as he was having moderate pain postoperatively overnight  Review of Systems     Denies any chest pain palpitation nausea  Appetite is average  Denies any urinary or bladder or bowel complaints  Denies any leg cramps denies any bleeding from his surgery site  Denies any mood disturbance  Feels sleepy and tired no cough no respiratory difficulty no pleuritic chest pains  All other systems reviewed and are negative         OBJECTIVE:     Vitals:     Temp:  [96 2 °F (35 7 °C)-98 1 °F (36 7 °C)] 97 °F (36 1 °C)  HR:  [65-79] 79  Resp:  [16-18] 18  BP: (114-158)/(67-93) 158/89  SpO2:  [99 %-100 %] 100 %  Temp (24hrs), Av 1 °F (36 2 °C), Min:96 2 °F (35 7 °C), Max:98 1 °F (36 7 °C)  Current: Temperature: (!) 97 °F (36 1 °C)    Intake/Output Summary (Last 24 hours) at 10/5/2019 1445  Last data filed at 10/5/2019 1100  Gross per 24 hour   Intake 2813 75 ml   Output 2025 ml   Net 788 75 ml     Body mass index is 23 45 kg/m²  Physical Exam     Awake and alert, easily aroused from his sleep  Pallor JVP cyanosis negative  Tongue protrudes in midline but appears to be dry  Lungs clear no rales or crackles  Regular S1-S2 no murmurs  Abdomen is soft nontender  Operated hip site is clear there is no discharge no drainage no bleeding is noted  Range of motion is limited on this operated left hip    Bilateral Homans sign is negative  Distal pulses are intact 1+        Labs, Imaging, & Other studies:    All pertinent labs and imaging studies were personally reviewed  Results from last 7 days   Lab Units 10/05/19  0519   HEMOGLOBIN g/dL 13 3     Results from last 7 days   Lab Units 10/05/19  0519   POTASSIUM mmol/L 3 8   CHLORIDE mmol/L 103   CO2 mmol/L 30   BUN mg/dL 10   CREATININE mg/dL 0 99   EGFR ml/min/1 73sq m 94   CALCIUM mg/dL 8 7           Current Meds:  Current Facility-Administered Medications   Medication Dose Route Frequency Provider Last Rate Last Dose    acetaminophen (TYLENOL) tablet 650 mg  650 mg Oral Q6H PRN Catarino De Santiago PA-C        aspirin (ECOTRIN LOW STRENGTH) EC tablet 81 mg  81 mg Oral BID Yue Hagen MD   81 mg at 10/05/19 1012    calcium carbonate (TUMS) chewable tablet 1,000 mg  1,000 mg Oral Daily PRN Catarino De Santiago PA-C        HYDROmorphone (DILAUDID) injection 0 2 mg  0 2 mg Intravenous Q3H PRN Catarino De Santiago PA-C   0 2 mg at 10/04/19 2107    lactated ringers infusion  125 mL/hr Intravenous Continuous Catarino De Santiago PA-C 125 mL/hr at 10/05/19 0352 125 mL/hr at 10/05/19 0352    methocarbamol (ROBAXIN) tablet 500 mg  500 mg Oral Q6H PRN Catarino De Santiago PA-C   500 mg at 10/05/19 1309    ondansetron (ZOFRAN) injection 4 mg  4 mg Intravenous Q6H PRN Catarino De Santiago PA-C        oxyCODONE (ROXICODONE) immediate release tablet 10 mg  10 mg Oral Q4H PRN Frosty Meager, PA-C   10 mg at 10/05/19 1012    oxyCODONE (ROXICODONE) IR tablet 5 mg  5 mg Oral Q4H PRN Frosty Meager, PA-C   5 mg at 10/05/19 9583    pantoprazole (PROTONIX) EC tablet 40 mg  40 mg Oral Early Morning Frosty Meager, PA-C   40 mg at 10/05/19 5154    senna (SENOKOT) tablet 8 6 mg  1 tablet Oral Daily Frosty Meager, PA-C   8 6 mg at 10/05/19 1012    simethicone (MYLICON) chewable tablet 80 mg  80 mg Oral 4x Daily PRN Frosty Meager, PA-C        sodium chloride 0 9 % infusion  125 mL/hr Intravenous Continuous Ashli Erps, DO   Stopped at 10/04/19 1400    traMADol (ULTRAM) tablet 50 mg  50 mg Oral Q6H PRN Frosty Meager, PA-C   50 mg at 10/04/19 1658     Home Meds:  Medications Prior to Admission   Medication    HYDROcodone-acetaminophen (NORCO) 5-325 mg per tablet    ibuprofen (MOTRIN) 800 mg tablet       Continuous Infusions:    lactated ringers 125 mL/hr Last Rate: 125 mL/hr (10/05/19 0352)   sodium chloride 125 mL/hr Last Rate: Stopped (10/04/19 1400)       Invasive Devices     Peripheral Intravenous Line            Peripheral IV 10/04/19 Right Arm 1 day                VTE Pharmacologic Prophylaxis: ASA as per Primary Ortho Team   VTE Mechanical Prophylaxis: sequential compression device    Portions of the record may have been created with voice recognition software  Occasional wrong word or "sound a like" substitutions may have occurred due to the inherent limitations of voice recognition software  Read the chart carefully and recognize, using context, where substitutions have occurred

## 2019-10-05 NOTE — DISCHARGE SUMMARY
DISCHARGE SUMMARY      Patient Name: Nat Quinones  Patient MRN: [id number]  Admitting Provider: Catarino Prince MD  Discharging Provider: Catarino Prince MD  Primary Care Physician at Discharge: Guzman Jones [place] [phone]  Admission Date: 10/4/2019       Discharge Date: 10/06/19        Admission Diagnosis  Primary osteoarthritis of left hip [M16 12]    Discharge Diagnoses  [facility]  Nat Quinones was admitted to [facility] on 10/4/2019, with diagnosis of osteoarthritis in his Left hip  On the day of admission, he was brought to surgery, successfully underwent a Left hip replacement  He tolerated the procedure well  Following surgery, he was taken to the recovery room, at which time, there was a consult placed for Dr Paramjit Patel for the patient's medical management  After a short stay in the recovery room, he was transferred to the orthopedic floor at which time, he was resumed on his routine home medications per the hospitalist group  On postop day #1, he was able to start some physical therapy and was able to tolerate it well  At this time, he was in stable condition, showing no sign of deep vein thrombosis or pulmonary embolism  Incision was healing well at the time and showed no signs of infection  DISCHARGE DIAGNOSIS: Primary osteoarthritis of left hip [M16 12]  He is now status post Left total hip replacement, which he underwent during the hospital stay  Medications  See after visit summary for reconciled discharge medications provided to patient and family  Allergies  Allergies   Allergen Reactions    Meloxicam Anaphylaxis       Outpatient Follow-Up  No future appointments      Discharge Disposition  home    Operative Procedures Performed  Procedure(s):  ARTHROPLASTY HIP TOTAL ANTERIOR        Eliezer Walsh PA-C    DISCHARGE SUMMARY

## 2019-10-05 NOTE — PLAN OF CARE
Problem: PHYSICAL THERAPY ADULT  Goal: Performs mobility at highest level of function for planned discharge setting  See evaluation for individualized goals  Description  Treatment/Interventions: Functional transfer training, LE strengthening/ROM, Elevations, Therapeutic exercise, Endurance training, Patient/family training, Equipment eval/education, Bed mobility, Gait training, Compensatory technique education, Spoke to nursing, OT, Family  Equipment Recommended: Walker(pt reports he owns RW)       See flowsheet documentation for full assessment, interventions and recommendations  10/5/2019 1659 by Alexa Estrada PTA  Outcome: Progressing  Note:   Prognosis: Good  Problem List: Decreased strength, Decreased range of motion, Decreased endurance, Impaired balance, Decreased mobility, Decreased skin integrity, Pain  Assessment: PT seen for progression of mobility  Pt continues to requires min assist for transfers cues for handplacement, advancement and positioning of L le prior to sitting and cues for improved posture  Pt progressed ambulation distances to 15' x1 with min assist   NOted improved ability to advance L le NO physical assist required this session  Pt demonstrates gait deviations as noted no gross lob noted  Increased effort required for all aspects of mobility  Pt continued to report increased pain with all mobility and muscle spasms of l anterior thigh  Pt performs aa-arom to l le for Thr while seated in bedside chair x 10 reps  Pt requires min assist to lift L le into bed with use of sheet  As leg  and verbal cues for bed mobility techniques and increased time required to perform and complete all aspects of mobility  Pt declined ice to L hip  Pt's nurse Patti Santos notified of muscle spasms in l thigh  SCD's applied to b/l le's  Recommend continued inpt PT for bed mobility, transfers, gait and stair training, L le strengthening/ ROm for THR    Will focus on bed mobility, transfers and ambualtion next 1-2 sessions  Anticipate pt to progress well once pain controlled and be able to d/c to home with family support and OPPT  Recommendation: Outpatient PT, Home with family support     PT - OK to Discharge: No(needs further mobility and stair training  )    See flowsheet documentation for full assessment

## 2019-10-05 NOTE — PLAN OF CARE
Problem: PHYSICAL THERAPY ADULT  Goal: Performs mobility at highest level of function for planned discharge setting  See evaluation for individualized goals  Description  Treatment/Interventions: Functional transfer training, LE strengthening/ROM, Elevations, Therapeutic exercise, Endurance training, Patient/family training, Equipment eval/education, Bed mobility, Gait training, Compensatory technique education, Spoke to nursing, OT, Family  Equipment Recommended: Walker(pt reports he owns RW)       See flowsheet documentation for full assessment, interventions and recommendations  Outcome: Progressing  Note:   Prognosis: Good  Problem List: Decreased strength, Decreased range of motion, Decreased endurance, Impaired balance, Decreased mobility, Decreased skin integrity, Pain  Assessment: Pt is a 37 y o  male seen for PT evaluation s/p admit to [facility] on 10/4/19  Two pt identifiers were used to confirm  Pt presented s/p L MATTY which was performed on 10/4/19  Pt was admitted with a primary dx of: primary osteoarthritis of left hip  PT now consulted for assessment of mobility and d/c needs  Pts current co morbidities effecting treatment include: ambulatory dysfunction and personal factors including 2 KAYE home environment  Pt currently lives in an apartment with his family  Pts current clinical presentation is Unstable/ Unpredictable (high complexity) due to Ongoing medical management for primary dx, Increased reliance on more restrictive AD compared to baseline, decreased activity tolerance compared to baseline, fall risk, increased assistance needed from caregiver at current time, continuous pulse oximetry monitoring, multiple lines, decline in overall functional mobility status  Prior to admission, pt was I with ambulation without the use of an AD as per pt  Upon evaluation, pt currently is requiring min A for bed mobility; min A for transfers and min A for ambulation w/ RW   Pt displays decreased step and stride length, decreased gait speed, improper weight shift  Pt presents at PT eval functioning below baseline and currently w/ overall mobility deficits secondary to: decreased strength, decreased endurance, impaired balance, impaired coordination, pain  Pt currently at a fall risk secondary to impairments listed above  Based on PT evaluation, pt will continue to benefit from skilled acute PT interventions to address stated impairments; to maximize functional mobility; for ongoing pt/ family training; and DME needs  At conclusion of PT session pt was left seated in bedside chair, all needs within reach  Provided pt education regarding PT plan to improve functional mobility status  PT is currently recommending home with family support and outpatient PT  Pt agreeable to plan and goals as stated on evaluation  PT will continue to follow during hospital stay  Recommendation: Home with family support, Outpatient PT     PT - OK to Discharge: No    See flowsheet documentation for full assessment

## 2019-10-05 NOTE — PHYSICAL THERAPY NOTE
Physical Therapy Treatment Note     10/05/19 1116   Pain Assessment   Pain Assessment 0-10   Pain Score 6   Pain Type Surgical pain;Acute pain   Pain Location Hip   Pain Orientation Left   Hospital Pain Intervention(s) Repositioned;Cold applied; Ambulation/increased activity; Emotional support   Response to Interventions tolerated   Restrictions/Precautions   LLE Weight Bearing Per Order WBAT   Other Precautions WBS;Pain; Fall Risk   General   Chart Reviewed Yes   Family/Caregiver Present No   Cognition   Overall Cognitive Status WFL   Subjective   Subjective Pt repots pain being better 6/10  Pt agreeable to PT  Bed Mobility   Additional Comments Pt seated at edge of bed upon arrival      Transfers   Sit to Stand 4  Minimal assistance   Additional items Assist x 1; Increased time required;Verbal cues   Stand to Sit 4  Minimal assistance   Additional items Assist x 1; Armrests; Increased time required;Verbal cues   Additional Comments cues for handplacement, body positioning and Lle placement and positioning  Ambulation/Elevation   Gait pattern Improper Weight shift; Poor UE support; Forward Flexion;Decreased foot clearance; Antalgic;Decreased L stance; Foward flexed; Short stride; Step to;Excessively slow; Inconsistent niki   Gait Assistance 3  Moderate assist   Additional items Assist x 1;Verbal cues   Assistive Device Rolling walker   Distance 3' x1 forward, 3' x 2 backward, 5' x1, assit required to advance L le  Balance   Static Sitting Good   Static Standing Fair -   Dynamic Standing Poor +   Ambulatory Poor +   Endurance Deficit   Endurance Deficit Yes   Endurance Deficit Description pain, weakness, fatigue   Activity Tolerance   Activity Tolerance Patient limited by pain; Patient limited by fatigue   Medical Staff Made Aware Trice Davis   Nurse Made Aware yes   Assessment   Prognosis Good   Problem List Decreased strength;Decreased range of motion;Decreased endurance; Impaired balance;Decreased mobility;Pain;Decreased skin integrity   Assessment Pt seated at edge of bed upon arrival  reporting pain better 6/10 at rest   PT demonstrates impaired ability to perform transfers and ambulation due to decreased balance, L le hip and thigh pain decreased strength, difficulty lifting and advancing L le, dec weightshifting, and dec activity tolerance  Pt requiring physical assistance to lift and advance L le  Pt able to ambualte short distances of 5' x1, 3' x1 forward and 3' x1 backward with increased time, cues and assistance  Pt remained seated out of bed in chair post PT session with scd's to b/l le's and ice to L hip and anterior thigh groin  Pt will continue to benefit from skilled inpt PT for bed mobility, transfer and gait training, strengthening/ ROM,  and stair training  Will focus on bed mobility, transfers and ambualtion next 1-2 sessions   Anticipate pt to progress well once pain controlled and be able to d/c to home with family support and OPPT  Goals   Patient Goals " to get back to doing the things I did before "     [facility] Expiration Date 10/18/19   PT Treatment Day 2   Plan   Treatment/Interventions Functional transfer training; Endurance training;Patient/family training;Equipment eval/education;Gait training;Spoke to nursing   Progress Slow progress, decreased activity tolerance  (pain)   PT Frequency 7x/wk; Twice a day   Recommendation   Recommendation Outpatient PT; Home with family support   PT - OK to Discharge No        10/05/19 1116   Pain Assessment   Pain Assessment 0-10   Pain Score 6   Pain Type Surgical pain;Acute pain   Pain Location Hip   Pain Orientation Left   Hospital Pain Intervention(s) Repositioned;Cold applied; Ambulation/increased activity; Emotional support   Response to Interventions tolerated   Restrictions/Precautions   LLE Weight Bearing Per Order WBAT   Other Precautions WBS;Pain; Fall Risk   General   Chart Reviewed Yes   Family/Caregiver Present No   Cognition   Overall Cognitive Status WFL   Subjective   Subjective Pt repots pain being better 6/10  Pt agreeable to PT  Bed Mobility   Additional Comments Pt seated at edge of bed upon arrival      Transfers   Sit to Stand 4  Minimal assistance   Additional items Assist x 1; Increased time required;Verbal cues   Stand to Sit 4  Minimal assistance   Additional items Assist x 1; Armrests; Increased time required;Verbal cues   Additional Comments cues for handplacement, body positioning and Lle placement and positioning  Ambulation/Elevation   Gait pattern Improper Weight shift; Poor UE support; Forward Flexion;Decreased foot clearance; Antalgic;Decreased L stance; Foward flexed; Short stride; Step to;Excessively slow; Inconsistent niki   Gait Assistance 3  Moderate assist   Additional items Assist x 1;Verbal cues   Assistive Device Rolling walker   Distance 3' x1 forward, 3' x 2 backward, 5' x1, assit required to advance L le  Balance   Static Sitting Good   Static Standing Fair -   Dynamic Standing Poor +   Ambulatory Poor +   Endurance Deficit   Endurance Deficit Yes   Endurance Deficit Description pain, weakness, fatigue   Activity Tolerance   Activity Tolerance Patient limited by pain; Patient limited by fatigue   Medical Staff Made Aware Albertina Tnea   Nurse Made Aware yes   Assessment   Prognosis Good   Problem List Decreased strength;Decreased range of motion;Decreased endurance; Impaired balance;Decreased mobility;Pain;Decreased skin integrity   Assessment Pt seated at edge of bed upon arrival  reporting pain better 6/10 at rest   PT demonstrates impaired ability to perform transfers and ambulation due to decreased balance, L le hip and thigh pain decreased strength, difficulty lifting and advancing L le, dec weightshifting, and dec activity tolerance  Pt requiring physical assistance to lift and advance L le    Pt able to ambualte short distances of 5' x1, 3' x1 forward and 3' x1 backward with increased time, cues and assistance  Pt remained seated out of bed in chair post PT session with scd's to b/l le's and ice to L hip and anterior thigh groin  Pt will continue to benefit from skilled inpt PT for bed mobility, transfer and gait training, strengthening/ ROM,  and stair training  Will focus on bed mobility, transfers and ambualtion next 1-2 sessions   Anticipate pt to progress well once pain controlled and be able to d/c to home with family support and OPPT  Goals   Patient Goals " to get back to doing the things I did before "     [facility] Expiration Date 10/18/19   PT Treatment Day 2   Plan   Treatment/Interventions Functional transfer training; Endurance training;Patient/family training;Equipment eval/education;Gait training;Spoke to nursing   Progress Slow progress, decreased activity tolerance  (pain)   PT Frequency 7x/wk; Twice a day   Recommendation   Recommendation Outpatient PT; Home with family support   PT - OK to Discharge Meli Carnes Junior, ALEX

## 2019-10-05 NOTE — PROGRESS NOTES
At 4PM on the floor: Was having some hip pain  CDI  5/5 EFTG operative side  A/P: PT; Med input; pain control  RN had just given him an IV dilaudid dose  His thigh was soft and dressing CDI  We discussed using Celebrex or Toradol but he has an allergy to Meloxicam (anaphylaxis) and did not tolerate Celebrex in the past   He does tolerate ASA however  At any rate, discussed pain management plan with the nurse and he and his S  O  were in agreement

## 2019-10-05 NOTE — PROGRESS NOTES
Orthopedic Total Hip Progress Note      Subjective     Status post-Left total hip arthroplasty  Systemic or Specific Complaints: No Complaints    Objective     Vital signs in last 24 hours:  Temp:  [96 2 °F (35 7 °C)-98 1 °F (36 7 °C)] 98 1 °F (36 7 °C)  HR:  [56-77] 77  Resp:  [12-18] 18  BP: (109-153)/(53-93) 153/93    Neurovascular: Capillary refill: Normal  Dorsiflexion: 5/5  Plantarflexion:  5/5   Wound: Dressing:  clean/dry/intact   DVT Exam: No evidence of DVT seen on physical exam   Negative Joni's sign  No cords or calf tenderness  No significant calf/ankle edema  Data Review:  CBC:   Lab Results   Component Value Date    WBC 5 80 09/09/2019    RBC 4 80 09/09/2019    HGB 13 3 10/05/2019    HCT 40 6 10/05/2019     09/09/2019       Assessment/Plan     Status post-Left total hip arthroplasty: Doing well postoperatively      Continues current post-op course    Activity: up with assistance      Nick Lee PA-C    Date: 10/5/2019  Time: 8:28 AM

## 2019-10-06 VITALS
HEART RATE: 86 BPM | DIASTOLIC BLOOD PRESSURE: 83 MMHG | BODY MASS INDEX: 23.3 KG/M2 | SYSTOLIC BLOOD PRESSURE: 129 MMHG | WEIGHT: 187.39 LBS | TEMPERATURE: 98 F | OXYGEN SATURATION: 100 % | HEIGHT: 75 IN | RESPIRATION RATE: 16 BRPM

## 2019-10-06 LAB
ANION GAP SERPL CALCULATED.3IONS-SCNC: 8 MMOL/L (ref 4–13)
BUN SERPL-MCNC: 12 MG/DL (ref 5–25)
CALCIUM SERPL-MCNC: 8.9 MG/DL (ref 8.3–10.1)
CHLORIDE SERPL-SCNC: 102 MMOL/L (ref 100–108)
CO2 SERPL-SCNC: 25 MMOL/L (ref 21–32)
CREAT SERPL-MCNC: 0.82 MG/DL (ref 0.6–1.3)
GFR SERPL CREATININE-BSD FRML MDRD: 110 ML/MIN/1.73SQ M
GLUCOSE SERPL-MCNC: 109 MG/DL (ref 65–140)
HCT VFR BLD AUTO: 41.6 % (ref 36.5–49.3)
HGB BLD-MCNC: 13.9 G/DL (ref 12–17)
POTASSIUM SERPL-SCNC: 3.8 MMOL/L (ref 3.5–5.3)
SODIUM SERPL-SCNC: 135 MMOL/L (ref 136–145)

## 2019-10-06 PROCEDURE — 97530 THERAPEUTIC ACTIVITIES: CPT

## 2019-10-06 PROCEDURE — 85018 HEMOGLOBIN: CPT | Performed by: PHYSICIAN ASSISTANT

## 2019-10-06 PROCEDURE — 85014 HEMATOCRIT: CPT | Performed by: PHYSICIAN ASSISTANT

## 2019-10-06 PROCEDURE — 97110 THERAPEUTIC EXERCISES: CPT

## 2019-10-06 PROCEDURE — 97535 SELF CARE MNGMENT TRAINING: CPT

## 2019-10-06 PROCEDURE — 97116 GAIT TRAINING THERAPY: CPT

## 2019-10-06 PROCEDURE — 80048 BASIC METABOLIC PNL TOTAL CA: CPT | Performed by: PHYSICIAN ASSISTANT

## 2019-10-06 RX ADMIN — SENNOSIDES 8.6 MG: 8.6 TABLET, FILM COATED ORAL at 08:01

## 2019-10-06 RX ADMIN — OXYCODONE HYDROCHLORIDE 10 MG: 10 TABLET ORAL at 05:21

## 2019-10-06 RX ADMIN — PANTOPRAZOLE SODIUM 40 MG: 40 TABLET, DELAYED RELEASE ORAL at 05:22

## 2019-10-06 RX ADMIN — ASPIRIN 81 MG: 81 TABLET, COATED ORAL at 08:01

## 2019-10-06 RX ADMIN — OXYCODONE HYDROCHLORIDE 10 MG: 10 TABLET ORAL at 09:43

## 2019-10-06 NOTE — PROGRESS NOTES
Orthopedic Total Hip Progress Note      Subjective     Status post-Left total hip arthroplasty  Systemic or Specific Complaints: No Complaints    Objective     Vital signs in last 24 hours:  Temp:  [97 °F (36 1 °C)-99 2 °F (37 3 °C)] 98 °F (36 7 °C)  HR:  [79-98] 86  Resp:  [16-18] 16  BP: (129-160)/(77-98) 129/83    Neurovascular: Capillary refill: Normal  Dorsiflexion: 5/5  Plantarflexion:  5/5   Wound: Dressing:  clean/dry/intact   DVT Exam: No evidence of DVT seen on physical exam   Negative Joni's sign  No cords or calf tenderness  No significant calf/ankle edema  Data Review:  CBC:   Lab Results   Component Value Date    WBC 5 80 09/09/2019    RBC 4 80 09/09/2019    HGB 13 9 10/06/2019    HCT 41 6 10/06/2019     09/09/2019       Assessment/Plan     Status post-Left total hip arthroplasty: Doing well postoperatively      Discharge today, Return to Clinic: as scheduled    Activity: up with assistance      Joey Mosqueda PA-C    Date: 10/6/2019  Time: 8:01 AM

## 2019-10-06 NOTE — PROGRESS NOTES
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  Postoperative hemoglobin 13 3--> 13 9    Recommendation:    · Patient is hemodynamically stable as evident on the flow sheets  · Patients  pain is well controlled with current analgesics  · Medically stable for discharge with out patient follow ups as arranged by   · Continue DVT prophylaxis as per surgical team with ASA 81 mg PO BID  · Monitor for any redness/ drainage / swelling around site of surgery and to notify Orthopedic team or PCP  · Recommend life style modifications and any high impact activities  · Continue PT /OT to improve endurance, range of motion, gait stabilization and use of assist device in a safe manner  · Recheck Hemoglobin and hematocrit  through PCP in 1 week upon discharge  · Full fall and orthostatic precautions  Subjective:     Patient seen and examined today  EMR and overnight events reviewed  Patient was noted to be sitting comfortably and is awaiting to be picked up by his wife  He states that his pain is much better today with analgesics  He was able to participate with therapy team without any difficulty  Denies any bleeding from his surgical site  He has slight soreness on his left hip but overall doing well  Has been using safely his rolling walker to ambulate  Patient denies any fever chills chest pain palpitation or leg swelling no bleeding from his operated hip area  Denies any dysuria frequency no dizziness no lightheadedness  Appetite is normal no nausea vomiting  Denies any skin rashes  All other systems reviewed and are negative         OBJECTIVE:     Vitals:     Temp:  [98 °F (36 7 °C)-99 2 °F (37 3 °C)] 98 °F (36 7 °C)  HR:  [80-98] 86  Resp:  [16-18] 16  BP: (129-160)/(77-98) 129/83  SpO2:  [99 %-100 %] 100 %  Temp (24hrs), Av 5 °F (36 9 °C), Min:98 °F (36 7 °C), Max:99 2 °F (37 3 °C)  Current: Temperature: 98 °F (36 7 °C)    Intake/Output Summary (Last 24 hours) at 10/6/2019 1328  Last data filed at 10/6/2019 0501  Gross per 24 hour   Intake    Output 2150 ml   Net -2150 ml     Body mass index is 23 42 kg/m²  Physical Exam     Pleasant gentleman sitting comfortably on the couch in his room  He was fully dressed up and is awaiting to be discharged  States his wife is pulling up his car and he has no other complaints    Awake alert oriented cooperative  Pallor JVP cyanosis negative  Bilateral lungs clear no rales or crackles  Regular S1-S2 no murmurs  Abdomen is soft nontender  Neurologically intact  Operated hip area clean dressing without discharge or drainage  Skin is dry and warm no rashes  Mood fair and neutral      Labs, Imaging, & Other studies:    All pertinent labs and imaging studies were personally reviewed  Results from last 7 days   Lab Units 10/06/19  0503 10/05/19  0519   HEMOGLOBIN g/dL 13 9 13 3     Results from last 7 days   Lab Units 10/06/19  0503   POTASSIUM mmol/L 3 8   CHLORIDE mmol/L 102   CO2 mmol/L 25   BUN mg/dL 12   CREATININE mg/dL 0 82   EGFR ml/min/1 73sq m 110   CALCIUM mg/dL 8 9           Current Meds:  Current Facility-Administered Medications   Medication Dose Route Frequency Provider Last Rate Last Dose    acetaminophen (TYLENOL) tablet 650 mg  650 mg Oral Q6H PRN Tara Beady, PA-C        aspirin (ECOTRIN LOW STRENGTH) EC tablet 81 mg  81 mg Oral BID Olivia Cantrell MD   81 mg at 10/06/19 0801    calcium carbonate (TUMS) chewable tablet 1,000 mg  1,000 mg Oral Daily PRN Tara Beady, PA-C        HYDROmorphone (DILAUDID) injection 0 2 mg  0 2 mg Intravenous Q3H PRN Tara Beady, PA-C   0 2 mg at 10/04/19 2106    lactated ringers infusion  125 mL/hr Intravenous Continuous Rose Hazy, PA-C 125 mL/hr at 10/05/19 0352 125 mL/hr at 10/05/19 0352    methocarbamol (ROBAXIN) tablet 500 mg  500 mg Oral Q6H PRN Rose Hazy, PA-C   500 mg at 10/05/19 1921    ondansetron [facility]) injection 4 mg  4 mg Intravenous Q6H PRN Rose Hazy, PA-C        oxyCODONE (ROXICODONE) immediate release tablet 10 mg  10 mg Oral Q4H PRN Rose Hazy, PA-C   10 mg at 10/06/19 2268    oxyCODONE (ROXICODONE) IR tablet 5 mg  5 mg Oral Q4H PRN Rose Hazy, PA-C   5 mg at 10/05/19 1744    pantoprazole (PROTONIX) EC tablet 40 mg  40 mg Oral Early Morning Rose Hazy, PA-C   40 mg at 10/06/19 0522    senna (SENOKOT) tablet 8 6 mg  1 tablet Oral Daily Rose Hazy, PA-C   8 6 mg at 10/06/19 0801    simethicone (MYLICON) chewable tablet 80 mg  80 mg Oral 4x Daily PRN Rose Hazy, PA-C        sodium chloride 0 9 % infusion  125 mL/hr Intravenous Continuous Birtha DO Miguel Angel   Stopped at 10/04/19 1400    traMADol (ULTRAM) tablet 50 mg  50 mg Oral Q6H PRN Rose Hazy, PA-C   50 mg at 10/04/19 1658     Current Outpatient Medications   Medication Sig Dispense Refill    aspirin (ECOTRIN LOW STRENGTH) 81 mg EC tablet Take 1 tablet (81 mg total) by mouth 2 (two) times a day 90 tablet 0    methocarbamol (ROBAXIN) 500 mg tablet Take 1 tablet (500 mg total) by mouth every 6 (six) hours as needed for muscle spasms 40 tablet 0    oxyCODONE (ROXICODONE) 5 mg immediate release tablet Take 1-2 tablets (5-10 mg total) by mouth every 6 (six) hours as needed for moderate pain for up to 10 daysMax Daily Amount: 40 mg 60 tablet 0    traMADol (ULTRAM) 50 mg tablet Take 1 tablet (50 mg total) by mouth every 6 (six) hours as needed (mild pain) for up to 10 days 40 tablet 0         VTE Pharmacologic Prophylaxis: ASA as per Primary Ortho Team     Portions of the record may have been created with voice recognition software   Occasional wrong word or "sound a like" substitutions may have occurred due to the inherent limitations of voice recognition software  Read the chart carefully and recognize, using context, where substitutions have occurred

## 2019-10-06 NOTE — NURSING NOTE
Pt discharged home with outpatient PT  IV discontinued at this time  Instructions went over with pt and there are no further questions at this time

## 2019-10-06 NOTE — OCCUPATIONAL THERAPY NOTE
Occupational Therapy Treatment Note:         10/06/19 1045   Restrictions/Precautions   Weight Bearing Precautions Per Order Yes   LLE Weight Bearing Per Order WBAT   Other Precautions WBS; Fall Risk;Pain   Pain Assessment   Pain Assessment 0-10   Pain Score 8   Pain Type Surgical pain   Pain Location Hip   Pain Orientation Left   ADL   Where Assessed Chair   Grooming Assistance 5  Supervision/Setup   Grooming Deficit Setup   UB Bathing Assistance 5  Supervision/Setup   UB Bathing Deficit Setup   LB Bathing Assistance 5  Supervision/Setup   LB Bathing Deficit Setup   UB Dressing Assistance 5  Supervision/Setup   UB Dressing Deficit Setup   LB Dressing Assistance 5  Supervision/Setup   LB Dressing Deficit Setup;Steadying; Requires assistive device for steadying;Verbal cueing; Don/doff R sock; Don/doff L sock; Thread RLE into underwear; Thread LLE into underwear;Pull up over hips;Don/doff L shoe;Don/doff R shoe;Use of adaptive equipment   LB Dressing Comments LHAE used with activity  Toileting Assistance  5  Supervision/Setup   Toileting Deficit Setup   Functional Standing Tolerance   Time 8 mins   Activity dynamic stand balance activity  Comments Pt without LOB noted able to tolerate without further increase in pain levels  Bed Mobility   Supine to Sit Unable to assess   Transfers   Sit to Stand 5  Supervision   Additional items Assist x 1   Stand to Sit 5  Supervision   Additional items Assist x 1   Stand pivot 5  Supervision   Additional items Assist x 1   Toilet transfer 5  Supervision   Additional items Assist x 1   Functional Mobility   Functional Mobility 5  Supervision   Additional Comments x1   Additional items Rolling walker   Toilet Transfers   Toilet Transfer From Rolling walker   Toilet Transfer Type To and from   Toilet Transfer to Standard bedside commode   Toilet Transfer Technique Stand pivot; Ambulating   Toilet Transfers Verbal cues; Supervision   Cognition   Overall Cognitive Status [facility] Arousal/Participation Alert; Responsive; Cooperative   Attention Within functional limits   Orientation Level Oriented X4   Memory Decreased short term memory;Decreased recall of recent events;Decreased recall of precautions   Following Commands Follows multistep commands with increased time or repetition   Additional Activities   Additional Activities Other (Comment)  (reviews for RW safety/fall prevention used  )   Additional Activities Comments Pt reports having F understanding  Activity Tolerance   Activity Tolerance Patient tolerated treatment well   Medical Staff Made Aware Pt able to tolerate functional tasks with out LOB    Assessment   Assessment Pt was seen for skilled OT with focus on completion of self care tasks, functional mobility, review of fall prevention and review of current plan of care  Pt with  Noted improvement in all areas  ADL routine as follows  Routine completed in standard chair positioned in front of sink with all items with in reach  Grooming: S UB Bathing: S UB Dressing: S LB Bathing: S LB Dressing: S Toileting: S Bed Mobility: S  Transfers: S  Reviewed RW safety and need for stabilization with functional tasks instance while remaining with inside RW perimeter  Pt may benefit from further outpatient PT to encourage optimal performance levels with all functional tasks      Plan   Treatment Interventions ADL retraining;Functional transfer training   Goal Expiration Date 10/11/19   OT Treatment Day 1   OT Frequency 3-5x/wk   Recommendation   OT Discharge Recommendation   (outpatient therapies  )   OT - OK to Discharge Yes   Barthel Index   Feeding 10   Bathing 0   Grooming Score 5   Dressing Score 5   Bladder Score 0   Bowels Score 10   Toilet Use Score 5   Transfers (Bed/Chair) Score 10   Mobility (Level Surface) Score 0   Stairs Score 0   Barthel Index Score 45   Modified Jillian Scale   Modified Jillian Scale 4   Tania Winslow, [address]

## 2019-10-06 NOTE — PHYSICAL THERAPY NOTE
Pt seen in split PT session [id number] and 1107-1224   Physical Therapy Treatment Note         10/06/19 1200   Pain Assessment   Pain Assessment 0-10   Pain Score 3   Pain Type Surgical pain   Pain Location Hip   Pain Orientation Left   Pain Descriptors Aching   Pain Frequency Intermittent   Clinical Progression Rapidly improving   Hospital Pain Intervention(s) Repositioned; Ambulation/increased activity; Emotional support   Response to Interventions tolerated   Restrictions/Precautions   LLE Weight Bearing Per Order WBAT   Other Precautions Pain; Fall Risk;WBS  (anterior THR)   General   Chart Reviewed Yes   Family/Caregiver Present No   Cognition   Overall Cognitive Status WFL   Subjective   Subjective Pt out of bed seated in [facility] upon arrival  " Better today "     Bed Mobility   Supine to Sit 6  Modified independent   Additional items Increased time required;Leg ;Verbal cues; Assist x 1  (use of sheet to lift L le in and out of bed  )   Sit to Supine 3  Moderate assistance   Additional items Assist x 1; Increased time required;Leg    Transfers   Sit to Stand 6  Modified independent   Additional items Assist x 1; Armrests   Stand to Sit 6  Modified independent   Additional items Assist x 1; Armrests   Ambulation/Elevation   Gait pattern Forward Flexion;Narrow BHUPENDRA; Excessively slow; Short stride; Step to  (toes in on L le, progressing tos tep through gait pattern)   Gait Assistance 5  Supervision   Additional items Assist x 1;Verbal cues   Assistive Device Rolling walker   Distance 110' x2, 20' x2   Stair Management Assistance 5  Supervision  (close supervision)   Additional items Assist x 1;Assist x 3; Increased time required   Stair Management Technique One rail R;Foreward;Nonreciprocal;With cane   Number of Stairs   (5 steps x 4)   Balance   Static Sitting Good   Dynamic Sitting Fair +   Static Standing Fair +   Dynamic Standing Fair +   Ambulatory Fair +   Endurance Deficit   Endurance Deficit Yes   Endurance Deficit Description pain   Activity Tolerance   Activity Tolerance Patient tolerated treatment well;Patient limited by pain   Medical Staff Made Aware simran BULL    Nurse Made Aware yes   Exercises   THR Supine;AAROM;AROM; Left;Bilateral  (x 12 reps  a/a hip abd /add, )   Equipment Use   Comments Pt ed on stair climbing techniques with use of R rail and SPC on L  Pt ed on car transfers, and continued mobility and HEP upon d/c to home  Assessment   Prognosis Good   Problem List Decreased strength;Decreased range of motion;Decreased endurance; Impaired balance;Decreased mobility;Pain;Orthopedic restrictions;Decreased skin integrity   Assessment  Pt seen for progression of mobility, bed mobility, transfers and stair training and THR HEP and pt education  Less pain today  Noted improved ability to perform bed mobility, transfers and ambulation on levels  Less assistance required for all aspects of mobilty  Improved tolerance to activity  Pt performs sit <--> stand transfers from various surfaces and heights with mod I, ambulates on levels with use of rw with supervision assist x1 with cues for improved quality of gait for step through gait pattern and cues to widen base of support and point toes forward on l le  Pt progressed with ambulation distances to 110' x1 with use of rw  Pt progressed to stair climbing with use of R rail and SPC on L with close supervision assist x1 and verbal cues for proper sequencing of le's and spc and proper placement of Rw  Pt performs and completes sit to supine and supine to sit with mod I with use of sheet to lift and manuver L le in and out of bed with increased time  Pt performs supine THR aa-arom x 12 reps to L le assistance required for hip abd /add exercises  Pt showing good progress toward goals, no gross lob noted during transfer, gait or stair training  Pt acknowledges good understanding of car transfers and home management techniques    Pt encouraged to set- up OPPT appointment ASAP  Recommend d/c to home with family support and assist PRN and OPPT   Goals   Patient Goals " to get back to doing the things I did before "     STG Expiration Date 10/18/19   PT Treatment Day 4   Plan   Treatment/Interventions Functional transfer training;LE strengthening/ROM; Endurance training; Therapeutic exercise;Elevations; Patient/family training;Equipment eval/education; Bed mobility;Gait training;Spoke to nursing   Progress Improving as expected   PT Frequency 7x/wk; Twice a day   Recommendation   Recommendation Outpatient PT; Home with family support   PT - OK to Discharge Yes     Basil Lindsey, PTA

## 2019-10-06 NOTE — PLAN OF CARE
Problem: PHYSICAL THERAPY ADULT  Goal: Performs mobility at highest level of function for planned discharge setting  See evaluation for individualized goals  Description  Treatment/Interventions: Functional transfer training, LE strengthening/ROM, Elevations, Therapeutic exercise, Endurance training, Patient/family training, Equipment eval/education, Bed mobility, Gait training, Compensatory technique education, Spoke to nursing, OT, Family  Equipment Recommended: Walker(pt reports he owns RW)       See flowsheet documentation for full assessment, interventions and recommendations  10/6/2019 1833 by Marisa Vale PTA  Outcome: Progressing  Note:   Prognosis: Good  Problem List: Decreased strength, Decreased range of motion, Decreased endurance, Impaired balance, Decreased mobility, Pain, Orthopedic restrictions, Decreased skin integrity  Assessment:  Pt seen for progression of mobility, bed mobility, transfers and stair training and THR HEP and pt education  Less pain today  Noted improved ability to perform bed mobility, transfers and ambulation on levels  Less assistance required for all aspects of mobilty  Improved tolerance to activity  Pt performs sit <--> stand transfers from various surfaces and heights with mod I, ambulates on levels with use of rw with supervision assist x1 with cues for improved quality of gait for step through gait pattern and cues to widen base of support and point toes forward on l le  Pt progressed with ambulation distances to 110' x1 with use of rw  Pt progressed to stair climbing with use of R rail and SPC on L with close supervision assist x1 and verbal cues for proper sequencing of le's and spc and proper placement of Rw  Pt performs and completes sit to supine and supine to sit with mod I with use of sheet to lift and manuver L le in and out of bed with increased time  Pt performs supine THR aa-arom x 12 reps to L le assistance required for hip abd /add exercises  Pt showing good progress toward goals, no gross lob noted during transfer, gait or stair training  Pt acknowledges good understanding of car transfers and home management techniques  Pt encouraged to set- up OPPT appointment ASAP  Recommend d/c to home with family support and assist PRN and OPPT        Recommendation: Outpatient PT, Home with family support     PT - OK to Discharge: Yes    See flowsheet documentation for full assessment

## 2020-07-09 ENCOUNTER — DOCTOR'S OFFICE (OUTPATIENT)
Dept: URBAN - NONMETROPOLITAN AREA CLINIC 1 | Facility: CLINIC | Age: 40
Setting detail: OPHTHALMOLOGY
End: 2020-07-09
Payer: COMMERCIAL

## 2020-07-09 DIAGNOSIS — T15.02XA: ICD-10-CM

## 2020-07-09 PROBLEM — H40.033 NARROW ANGLE; BOTH EYES: Status: ACTIVE | Noted: 2020-07-09

## 2020-07-09 PROCEDURE — 99203 OFFICE O/P NEW LOW 30 MIN: CPT | Performed by: OPHTHALMOLOGY

## 2020-07-09 PROCEDURE — 65222 REMOVE FOREIGN BODY FROM EYE: CPT | Performed by: OPHTHALMOLOGY

## 2020-07-09 ASSESSMENT — CONFRONTATIONAL VISUAL FIELD TEST (CVF)
OS_FINDINGS: FULL
OD_FINDINGS: FULL

## 2020-07-09 ASSESSMENT — VISUAL ACUITY
OD_BCVA: 20/25
OS_BCVA: 20/25+1

## 2021-04-19 ENCOUNTER — APPOINTMENT (EMERGENCY)
Dept: CT IMAGING | Facility: HOSPITAL | Age: 41
DRG: 372 | End: 2021-04-19
Payer: COMMERCIAL

## 2021-04-19 ENCOUNTER — HOSPITAL ENCOUNTER (INPATIENT)
Facility: HOSPITAL | Age: 41
LOS: 1 days | Discharge: HOME | DRG: 372 | End: 2021-04-21
Attending: EMERGENCY MEDICINE | Admitting: FAMILY MEDICINE
Payer: COMMERCIAL

## 2021-04-19 DIAGNOSIS — N17.9 AKI (ACUTE KIDNEY INJURY) (HCC): ICD-10-CM

## 2021-04-19 DIAGNOSIS — A02.0: ICD-10-CM

## 2021-04-19 DIAGNOSIS — E87.1 HYPONATREMIA: ICD-10-CM

## 2021-04-19 DIAGNOSIS — K52.9 COLITIS: Primary | ICD-10-CM

## 2021-04-19 PROBLEM — Z72.0 TOBACCO ABUSE: Status: ACTIVE | Noted: 2021-04-19

## 2021-04-19 LAB
ALBUMIN SERPL BCP-MCNC: 3.2 G/DL (ref 3.5–5)
ALP SERPL-CCNC: 106 U/L (ref 46–116)
ALT SERPL W P-5'-P-CCNC: 29 U/L (ref 12–78)
ANION GAP SERPL CALCULATED.3IONS-SCNC: 9 MMOL/L (ref 4–13)
AST SERPL W P-5'-P-CCNC: 24 U/L (ref 5–45)
ATRIAL RATE: 79 BPM
BACTERIA UR QL AUTO: NORMAL /HPF
BASE EX.OXY STD BLDV CALC-SCNC: 44.6 % (ref 60–80)
BASE EXCESS BLDV CALC-SCNC: -1.1 MMOL/L
BASOPHILS # BLD AUTO: 0.03 THOUSANDS/ΜL (ref 0–0.1)
BASOPHILS NFR BLD AUTO: 0 % (ref 0–1)
BETA-HYDROXYBUTYRATE: 0.2 MMOL/L
BILIRUB SERPL-MCNC: 0.38 MG/DL (ref 0.2–1)
BILIRUB UR QL STRIP: NEGATIVE
BUN SERPL-MCNC: 18 MG/DL (ref 5–25)
CALCIUM ALBUM COR SERPL-MCNC: 9.7 MG/DL (ref 8.3–10.1)
CALCIUM SERPL-MCNC: 9.1 MG/DL (ref 8.3–10.1)
CHLORIDE SERPL-SCNC: 93 MMOL/L (ref 100–108)
CLARITY UR: CLEAR
CO2 SERPL-SCNC: 27 MMOL/L (ref 21–32)
COLOR UR: YELLOW
CREAT SERPL-MCNC: 1.41 MG/DL (ref 0.6–1.3)
EOSINOPHIL # BLD AUTO: 0.01 THOUSAND/ΜL (ref 0–0.61)
EOSINOPHIL NFR BLD AUTO: 0 % (ref 0–6)
ERYTHROCYTE [DISTWIDTH] IN BLOOD BY AUTOMATED COUNT: 12.7 % (ref 11.6–15.1)
FLUAV RNA RESP QL NAA+PROBE: NEGATIVE
FLUBV RNA RESP QL NAA+PROBE: NEGATIVE
GFR SERPL CREATININE-BSD FRML MDRD: 61 ML/MIN/1.73SQ M
GLUCOSE SERPL-MCNC: 112 MG/DL (ref 65–140)
GLUCOSE UR STRIP-MCNC: NEGATIVE MG/DL
HCO3 BLDV-SCNC: 25.5 MMOL/L (ref 24–30)
HCT VFR BLD AUTO: 47.3 % (ref 36.5–49.3)
HGB BLD-MCNC: 16.5 G/DL (ref 12–17)
HGB UR QL STRIP.AUTO: ABNORMAL
IMM GRANULOCYTES # BLD AUTO: 0.05 THOUSAND/UL (ref 0–0.2)
IMM GRANULOCYTES NFR BLD AUTO: 1 % (ref 0–2)
KETONES UR STRIP-MCNC: NEGATIVE MG/DL
LACTATE SERPL-SCNC: 1.2 MMOL/L (ref 0.5–2)
LEUKOCYTE ESTERASE UR QL STRIP: NEGATIVE
LYMPHOCYTES # BLD AUTO: 1.55 THOUSANDS/ΜL (ref 0.6–4.47)
LYMPHOCYTES NFR BLD AUTO: 15 % (ref 14–44)
MCH RBC QN AUTO: 29.5 PG (ref 26.8–34.3)
MCHC RBC AUTO-ENTMCNC: 34.9 G/DL (ref 31.4–37.4)
MCV RBC AUTO: 85 FL (ref 82–98)
MONOCYTES # BLD AUTO: 1.07 THOUSAND/ΜL (ref 0.17–1.22)
MONOCYTES NFR BLD AUTO: 11 % (ref 4–12)
NEUTROPHILS # BLD AUTO: 7.37 THOUSANDS/ΜL (ref 1.85–7.62)
NEUTS SEG NFR BLD AUTO: 73 % (ref 43–75)
NITRITE UR QL STRIP: NEGATIVE
NON-SQ EPI CELLS URNS QL MICRO: NORMAL /HPF
NRBC BLD AUTO-RTO: 0 /100 WBCS
O2 CT BLDV-SCNC: 10.7 ML/DL
OSMOLALITY UR: 260 MMOL/KG
P AXIS: 67 DEGREES
PCO2 BLDV: 48.9 MM HG (ref 42–50)
PH BLDV: 7.33 [PH] (ref 7.3–7.4)
PH UR STRIP.AUTO: 6 [PH]
PLATELET # BLD AUTO: 180 THOUSANDS/UL (ref 149–390)
PMV BLD AUTO: 9.4 FL (ref 8.9–12.7)
PO2 BLDV: 25.2 MM HG (ref 35–45)
POTASSIUM SERPL-SCNC: 3.7 MMOL/L (ref 3.5–5.3)
PR INTERVAL: 144 MS
PROT SERPL-MCNC: 7.5 G/DL (ref 6.4–8.2)
PROT UR STRIP-MCNC: NEGATIVE MG/DL
QRS AXIS: -69 DEGREES
QRSD INTERVAL: 86 MS
QT INTERVAL: 346 MS
QTC INTERVAL: 396 MS
RBC # BLD AUTO: 5.6 MILLION/UL (ref 3.88–5.62)
RBC #/AREA URNS AUTO: NORMAL /HPF
RSV RNA RESP QL NAA+PROBE: NEGATIVE
SARS-COV-2 RNA RESP QL NAA+PROBE: NEGATIVE
SODIUM SERPL-SCNC: 129 MMOL/L (ref 136–145)
SP GR UR STRIP.AUTO: 1.01 (ref 1–1.03)
T WAVE AXIS: 69 DEGREES
TSH SERPL DL<=0.05 MIU/L-ACNC: 2.35 UIU/ML (ref 0.36–3.74)
UROBILINOGEN UR QL STRIP.AUTO: 0.2 E.U./DL
VENTRICULAR RATE: 79 BPM
WBC # BLD AUTO: 10.08 THOUSAND/UL (ref 4.31–10.16)
WBC #/AREA URNS AUTO: NORMAL /HPF

## 2021-04-19 PROCEDURE — 36415 COLL VENOUS BLD VENIPUNCTURE: CPT | Performed by: EMERGENCY MEDICINE

## 2021-04-19 PROCEDURE — 81001 URINALYSIS AUTO W/SCOPE: CPT | Performed by: EMERGENCY MEDICINE

## 2021-04-19 PROCEDURE — 96361 HYDRATE IV INFUSION ADD-ON: CPT

## 2021-04-19 PROCEDURE — 93005 ELECTROCARDIOGRAM TRACING: CPT

## 2021-04-19 PROCEDURE — 84300 ASSAY OF URINE SODIUM: CPT | Performed by: FAMILY MEDICINE

## 2021-04-19 PROCEDURE — 80053 COMPREHEN METABOLIC PANEL: CPT | Performed by: EMERGENCY MEDICINE

## 2021-04-19 PROCEDURE — 99220 PR INITIAL OBSERVATION CARE/DAY 70 MINUTES: CPT | Performed by: FAMILY MEDICINE

## 2021-04-19 PROCEDURE — 82805 BLOOD GASES W/O2 SATURATION: CPT | Performed by: EMERGENCY MEDICINE

## 2021-04-19 PROCEDURE — 83605 ASSAY OF LACTIC ACID: CPT | Performed by: EMERGENCY MEDICINE

## 2021-04-19 PROCEDURE — 85025 COMPLETE CBC W/AUTO DIFF WBC: CPT | Performed by: EMERGENCY MEDICINE

## 2021-04-19 PROCEDURE — 84443 ASSAY THYROID STIM HORMONE: CPT | Performed by: FAMILY MEDICINE

## 2021-04-19 PROCEDURE — 99285 EMERGENCY DEPT VISIT HI MDM: CPT | Performed by: EMERGENCY MEDICINE

## 2021-04-19 PROCEDURE — 83935 ASSAY OF URINE OSMOLALITY: CPT | Performed by: FAMILY MEDICINE

## 2021-04-19 PROCEDURE — 82010 KETONE BODYS QUAN: CPT | Performed by: EMERGENCY MEDICINE

## 2021-04-19 PROCEDURE — 0241U HB NFCT DS VIR RESP RNA 4 TRGT: CPT | Performed by: EMERGENCY MEDICINE

## 2021-04-19 PROCEDURE — 74177 CT ABD & PELVIS W/CONTRAST: CPT

## 2021-04-19 PROCEDURE — 99285 EMERGENCY DEPT VISIT HI MDM: CPT

## 2021-04-19 PROCEDURE — 96374 THER/PROPH/DIAG INJ IV PUSH: CPT

## 2021-04-19 RX ORDER — ONDANSETRON 2 MG/ML
4 INJECTION INTRAMUSCULAR; INTRAVENOUS EVERY 6 HOURS PRN
Status: DISCONTINUED | OUTPATIENT
Start: 2021-04-19 | End: 2021-04-21 | Stop reason: HOSPADM

## 2021-04-19 RX ORDER — ONDANSETRON 2 MG/ML
4 INJECTION INTRAMUSCULAR; INTRAVENOUS ONCE
Status: COMPLETED | OUTPATIENT
Start: 2021-04-19 | End: 2021-04-19

## 2021-04-19 RX ORDER — PANTOPRAZOLE SODIUM 40 MG/1
40 INJECTION, POWDER, FOR SOLUTION INTRAVENOUS
Status: DISCONTINUED | OUTPATIENT
Start: 2021-04-20 | End: 2021-04-21 | Stop reason: HOSPADM

## 2021-04-19 RX ORDER — ACETAMINOPHEN 325 MG/1
650 TABLET ORAL EVERY 6 HOURS PRN
Status: DISCONTINUED | OUTPATIENT
Start: 2021-04-19 | End: 2021-04-21 | Stop reason: HOSPADM

## 2021-04-19 RX ORDER — NICOTINE 21 MG/24HR
1 PATCH, TRANSDERMAL 24 HOURS TRANSDERMAL DAILY
Status: DISCONTINUED | OUTPATIENT
Start: 2021-04-20 | End: 2021-04-21 | Stop reason: HOSPADM

## 2021-04-19 RX ORDER — CIPROFLOXACIN 2 MG/ML
400 INJECTION, SOLUTION INTRAVENOUS EVERY 12 HOURS
Status: DISCONTINUED | OUTPATIENT
Start: 2021-04-19 | End: 2021-04-21 | Stop reason: HOSPADM

## 2021-04-19 RX ORDER — SODIUM CHLORIDE 9 MG/ML
75 INJECTION, SOLUTION INTRAVENOUS CONTINUOUS
Status: DISCONTINUED | OUTPATIENT
Start: 2021-04-19 | End: 2021-04-21 | Stop reason: HOSPADM

## 2021-04-19 RX ADMIN — SODIUM CHLORIDE 1000 ML: 0.9 INJECTION, SOLUTION INTRAVENOUS at 13:26

## 2021-04-19 RX ADMIN — IOHEXOL 100 ML: 350 INJECTION, SOLUTION INTRAVENOUS at 14:18

## 2021-04-19 RX ADMIN — METRONIDAZOLE 500 MG: 500 INJECTION, SOLUTION INTRAVENOUS at 22:31

## 2021-04-19 RX ADMIN — SODIUM CHLORIDE 1000 ML: 0.9 INJECTION, SOLUTION INTRAVENOUS at 16:58

## 2021-04-19 RX ADMIN — ACETAMINOPHEN 650 MG: 325 TABLET ORAL at 22:30

## 2021-04-19 RX ADMIN — SODIUM CHLORIDE 125 ML/HR: 0.9 INJECTION, SOLUTION INTRAVENOUS at 19:45

## 2021-04-19 RX ADMIN — ONDANSETRON 4 MG: 2 INJECTION INTRAMUSCULAR; INTRAVENOUS at 13:26

## 2021-04-19 RX ADMIN — CIPROFLOXACIN 400 MG: 2 INJECTION, SOLUTION INTRAVENOUS at 23:08

## 2021-04-19 NOTE — LETTER
[facility]  [address] [facility] [address]  Dept: [phone]    April 21, 2021     Patient: Dewey Asher   YOB: 1980   Date of Visit: 4/19/2021       To Whom it May Concern:    Walter Hillman is under my professional care  He was seen in the hospital from 4/19/2021   to 04/21/21  He may return to work on 4/23/2021 without limitations  If you have any questions or concerns, please don't hesitate to call           Sincerely,          Sherly Montiel PA-C

## 2021-04-19 NOTE — ED PROVIDER NOTES
History  Chief Complaint   Patient presents with    Abdominal Pain     Pt reports chills, lower abd cramping, and diarrhea since last thursday 4/15/21  Denies vomiting  58-year-old male complains of generally feeling unwell, fatigued, uncomfortable, began 4 days ago with lower abdominal pain, sharp, fairly severe and associated diarrhea, nonbloody  Notes diarrhea and pain occurring mostly when he eats or drinks and eating and drinking less  No prior episodes  Has not been smoking recently, no dyspnea    Past medical history includes low back pain  Surgical history appendectomy  Social history smokes tobacco, denies alcohol use and illicit use      History provided by:  Patient  Abdominal Pain  Pain location:  Suprapubic  Pain quality: sharp    Pain radiates to:  Does not radiate  Pain severity:  Moderate  Onset quality:  Sudden  Timing:  Intermittent  Progression:  Resolved  Chronicity:  New  Context: not alcohol use, not recent illness and not suspicious food intake    Relieved by:  None tried  Associated symptoms: anorexia and diarrhea    Associated symptoms: no chest pain, no dysuria, no fever, no shortness of breath and no vomiting        Prior to Admission Medications   Prescriptions Last Dose Informant Patient Reported?  Taking?   aspirin (ECOTRIN LOW STRENGTH) 81 mg EC tablet   No No   Sig: Take 1 tablet (81 mg total) by mouth 2 (two) times a day   methocarbamol (ROBAXIN) 500 mg tablet Not Taking at Unknown time  No No   Sig: Take 1 tablet (500 mg total) by mouth every 6 (six) hours as needed for muscle spasms   Patient not taking: Reported on 4/19/2021      Facility-Administered Medications: None       Past Medical History:   Diagnosis Date    DDD (degenerative disc disease), lumbar     Disc disorder of lumbar region     History of peptic ulcer     Infectious viral hepatitis     Hep C in "remission since 2004"    Osteonecrosis of multiple sites [facility])        Past Surgical History:   Procedure Laterality Date    APPENDECTOMY      HIP ARTHROPLASTY Right     x2    LUMBAR EPIDURAL INJECTION      MA TOTAL HIP ARTHROPLASTY Left 10/4/2019    Procedure: ARTHROPLASTY HIP TOTAL ANTERIOR;  Surgeon: Darling Salazar MD;  [facility];  Service: Orthopedics    MA TOTAL KNEE ARTHROPLASTY Left 11/30/2016    Procedure: ARTHROPLASTY KNEE TOTAL;  Surgeon: Darling Salazar MD;  [facility];  Service: Orthopedics    SHOULDER SURGERY Right     total replacement    WISDOM TOOTH EXTRACTION         History reviewed  No pertinent family history  I have reviewed and agree with the history as documented  E-Cigarette/Vaping     E-Cigarette/Vaping Substances     Social History     Tobacco Use    Smoking status: Current Every Day Smoker     Packs/day: 1 00     Years: 20 00     Pack years: 20 00     Types: Cigarettes    Smokeless tobacco: Never Used   Substance Use Topics    Alcohol use: Yes     Frequency: Monthly or less     Comment: rarely    Drug use: Not Currently     Types: Marijuana     Comment: rare       Review of Systems   Constitutional: Negative for fever  Respiratory: Negative for shortness of breath  Cardiovascular: Negative for chest pain  Gastrointestinal: Positive for abdominal pain, anorexia and diarrhea  Negative for vomiting  Genitourinary: Negative for dysuria  All other systems reviewed and are negative  Physical Exam  Physical Exam  Vitals signs and nursing note reviewed  Constitutional:       Comments: Pleasant, comfortable-appearing   HENT:      Head: Normocephalic and atraumatic  Mouth/Throat:      Comments: Orally dry  Eyes:      Conjunctiva/sclera: Conjunctivae normal       Pupils: Pupils are equal, round, and reactive to light  Neck:      Musculoskeletal: Neck supple  Cardiovascular:      Rate and Rhythm: Normal rate and regular rhythm  Heart sounds: Normal heart sounds     Pulmonary:      Effort: Pulmonary effort is normal       Breath sounds: Normal breath sounds  Abdominal:      General: Bowel sounds are normal  There is no distension  Palpations: Abdomen is soft  Tenderness: There is no abdominal tenderness  Genitourinary:     Penis: Normal        Scrotum/Testes: Normal    Musculoskeletal: Normal range of motion  Skin:     General: Skin is warm and dry  Neurological:      Mental Status: He is alert and oriented to person, place, and time  Cranial Nerves: No cranial nerve deficit  Coordination: Coordination normal    Psychiatric:         Behavior: Behavior normal          Thought Content:  Thought content normal          Judgment: Judgment normal          Vital Signs  ED Triage Vitals [04/19/21 1302]   Temperature Pulse Respirations Blood Pressure SpO2   97 7 °F (36 5 °C) 100 19 130/87 99 %      Temp Source Heart Rate Source Patient Position - Orthostatic VS BP Location FiO2 (%)   Temporal Monitor Lying Right arm --      Pain Score       No Pain           Vitals:    04/19/21 1302   BP: 130/87   Pulse: 100   Patient Position - Orthostatic VS: Lying         Visual Acuity      ED Medications  Medications   sodium chloride 0 9 % bolus 1,000 mL (has no administration in time range)   sodium chloride 0 9 % bolus 1,000 mL (0 mL Intravenous Stopped 4/19/21 1430)   ondansetron (ZOFRAN) injection 4 mg (4 mg Intravenous Given 4/19/21 1326)   iohexol (OMNIPAQUE) 350 MG/ML injection (SINGLE-DOSE) 100 mL (100 mL Intravenous Given 4/19/21 1418)       Diagnostic Studies  Results Reviewed     Procedure Component Value Units Date/Time    Urine Microscopic [[id number]]  (Normal) Collected: 04/19/21 1440    Lab Status: Final result Specimen: Urine, Clean Catch Updated: 04/19/21 1456     RBC, UA 1-2 /hpf      WBC, UA 2-4 /hpf      Epithelial Cells None Seen /hpf      Bacteria, UA Occasional /hpf     UA (URINE) with reflex to Scope [[id number]]  (Abnormal) Collected: 04/19/21 1440    Lab Status: Final result Specimen: Urine, Clean Catch Updated: 04/19/21 1446     Color, UA Yellow     Clarity, UA Clear     Specific Gravity, UA 1 010     pH, UA 6 0     Leukocytes, UA Negative     Nitrite, UA Negative     Protein, UA Negative mg/dl      Glucose, UA Negative mg/dl      Ketones, UA Negative mg/dl      Urobilinogen, UA 0 2 E U /dl      Bilirubin, UA Negative     Blood, UA Small    COVID19, Influenza A/B, RSV PCR, [facility] [[id number]]  (Normal) Collected: 04/19/21 1326    Lab Status: Final result Specimen: Nares from Nasopharyngeal Swab Updated: 04/19/21 1420     SARS-CoV-2 Negative     INFLUENZA A PCR Negative     INFLUENZA B PCR Negative     RSV PCR Negative    Narrative: This test has been authorized by FDA under an EUA (Emergency Use Assay) for use by authorized laboratories  Clinical caution and judgement should be used with the interpretation of these results with consideration of the clinical impression and other laboratory testing  Testing reported as "Positive" or "Negative" has been proven to be accurate according to standard laboratory validation requirements  All testing is performed with control materials showing appropriate reactivity at standard intervals  Lactic acid [[id number]]  (Normal) Collected: 04/19/21 1326    Lab Status: Final result Specimen: Blood from Arm, Right Updated: 04/19/21 1404     LACTIC ACID 1 2 mmol/L     Narrative:      Result may be elevated if tourniquet was used during collection      Comprehensive metabolic panel [[id number]]  (Abnormal) Collected: 04/19/21 1326    Lab Status: Final result Specimen: Blood from Arm, Right Updated: 04/19/21 1355     Sodium 129 mmol/L      Potassium 3 7 mmol/L      Chloride 93 mmol/L      CO2 27 mmol/L      ANION GAP 9 mmol/L      BUN 18 mg/dL      Creatinine 1 41 mg/dL      Glucose 112 mg/dL      Calcium 9 1 mg/dL      Corrected Calcium 9 7 mg/dL      AST 24 U/L      ALT 29 U/L      Alkaline Phosphatase 106 U/L      Total Protein 7 5 g/dL      Albumin 3 2 g/dL      Total Bilirubin 0 38 mg/dL eGFR 61 ml/min/1 73sq m     Narrative:      [place] guidelines for Chronic Kidney Disease (CKD):     Stage 1 with normal or high GFR (GFR > 90 mL/min/1 73 square meters)    Stage 2 Mild CKD (GFR = 60-89 mL/min/1 73 square meters)    Stage 3A Moderate CKD (GFR = 45-59 mL/min/1 73 square meters)    Stage 3B Moderate CKD (GFR = 30-44 mL/min/1 73 square meters)    Stage 4 Severe CKD (GFR = 15-29 mL/min/1 73 square meters)    Stage 5 End Stage CKD (GFR <15 mL/min/1 73 square meters)  Note: GFR calculation is accurate only with a steady state creatinine    Beta Hydroxybutyrate [[id number]]  (Normal) Collected: 04/19/21 1326    Lab Status: Final result Specimen: Blood from Arm, Right Updated: 04/19/21 1344     BETA-HYDROXYBUTYRATE 0 2 mmol/L     CBC and differential [[id number]] Collected: 04/19/21 1326    Lab Status: Final result Specimen: Blood from Arm, Right Updated: 04/19/21 1342     WBC 10 08 Thousand/uL      RBC 5 60 Million/uL      Hemoglobin 16 5 g/dL      Hematocrit 47 3 %      MCV 85 fL      MCH 29 5 pg      MCHC 34 9 g/dL      RDW 12 7 %      MPV 9 4 fL      Platelets 272 Thousands/uL      nRBC 0 /100 WBCs      Neutrophils Relative 73 %      Immat GRANS % 1 %      Lymphocytes Relative 15 %      Monocytes Relative 11 %      Eosinophils Relative 0 %      Basophils Relative 0 %      Neutrophils Absolute 7 37 Thousands/µL      Immature Grans Absolute 0 05 Thousand/uL      Lymphocytes Absolute 1 55 Thousands/µL      Monocytes Absolute 1 07 Thousand/µL      Eosinophils Absolute 0 01 Thousand/µL      Basophils Absolute 0 03 Thousands/µL     Blood gas, venous [[id number]]  (Abnormal) Collected: 04/19/21 1326    Lab Status: Final result Specimen: Blood from Arm, Right Updated: 04/19/21 1342     pH, Negro 7 335     pCO2, Negro 48 9 mm Hg      pO2, Negro 25 2 mm Hg      HCO3, Negro 25 5 mmol/L      Base Excess, Negro -1 1 mmol/L      O2 Content, Negro 10 7 ml/dL      O2 HGB, VENOUS 44 6 % CT abdomen pelvis with contrast   Final Result by Delaney Laureano MD ( 1503)      Diffuse proctocolitis and terminal ileitis, nonspecific, possibly infectious or inflammatory  The study was marked in [facility] for immediate notification  Workstation performed: [id number]                    Procedures  Procedures         ED Course  ED Course as of  163   Mon 2021   1344 EKG 1333 normal sinus rhythm rate 79 left axis normal intervals T-wave flat aVL no ST elevation or depression interpreted by me      1345 WBC: 10 08   1354 BETA-HYDROXYBUTYRATE: 0 2   1508 SARS-COV-2: Negative   1508 RBC, UA: 1-2   1508 LACTIC ACID: 1 2   1508 Sodium(!): 129   1508 Creatinine(!): 1 41   1616 Feels better, abdomen benign, discussed results and options including observation, hospitalist tiger texted                                              MDM    Disposition  Final diagnoses:   Colitis   DENY (acute kidney injury) ([facility] [address] )   Hyponatremia     Time reflects when diagnosis was documented in both MDM as applicable and the Disposition within this note     Time User Action Codes Description Comment    2021  4:28 PM Shirley Head Add [K52 9] Colitis     2021  4:28 PM Karla , Benna Brendan Add [N17 9] DENY (acute kidney injury) ([facility] [address] )     2021  4:28 PM Shirley Head Add [E87 1] Hyponatremia       ED Disposition     ED Disposition Condition Date/Time Comment    Admit Stable   4:27 PM Case was discussed with Jahaira and the patient's admission status was agreed to be Admission Status: observation status to the service of Dr Jeanette Brown          Follow-up Information    None         Patient's Medications   Discharge Prescriptions    No medications on file     No discharge procedures on file      PDMP Review       Value Time User    PDMP Reviewed  Yes 10/5/2019  6:06 AM Cj Soto PA-C          ED Provider  Electronically Signed by           Gilmer Guardado DO  21 0096

## 2021-04-19 NOTE — ASSESSMENT & PLAN NOTE
Sodium level is 129  Secondary to dehydration secondary to diarrhea  Continue IV hydration  Follow serum osmolality, urine osmolality, urine sodium

## 2021-04-19 NOTE — ASSESSMENT & PLAN NOTE
Secondary to severe diarrhea unknown etiology  Baseline creatinine is 1 1, today creatinine is 1 41  Continue IV hydration  Avoid hypotension  Avoid nephrotoxic medication  Monitor labs

## 2021-04-19 NOTE — ASSESSMENT & PLAN NOTE
Suspect infectious  Follow stool enteric panel, C diff  Continue IV hydration  Continue supportive care

## 2021-04-19 NOTE — H&P
[facility]  1980, 35 y o  male MRN: [id number]  Unit/Bed#: -01 Encounter: [id number]  Primary Care Provider: Comfort Ling DO   Date and time admitted to hospital: 4/19/2021 12:57 PM    * DENY (acute kidney injury) [facility])  Assessment & Plan  Secondary to severe diarrhea unknown etiology  Baseline creatinine is 1 1, today creatinine is 1 41  Continue IV hydration  Avoid hypotension  Avoid nephrotoxic medication  Monitor labs    Severe diarrhea  Assessment & Plan  Suspect infectious  Follow stool enteric panel, C diff  Continue IV hydration  Continue supportive care    Tobacco abuse  Assessment & Plan  Continue Nicoderm patch    Hyponatremia  Assessment & Plan  Sodium level is 129  Secondary to dehydration secondary to diarrhea  Continue IV hydration  Follow serum osmolality, urine osmolality, urine sodium      VTE Prophylaxis: Does not qualify  / sequential compression device   Code Status:  Full code    Anticipated Length of Stay:  Patient will be admitted on an Observation basis with an anticipated length of stay of  < 2 midnights  Justification for Hospital Stay:  To monitor above condition    Total Time for Visit, including Counseling / Coordination of Care: 45 minutes  Greater than 50% of this total time spent on direct patient counseling and coordination of care  Chief Complaint:   Severe diarrhea    History of Present Illness:    Nancy Estes is a 35 y o  male who presents with severe diarrhea for last few days, having average 6 episode, but since came to emergency room he is having little episode  Total watery  No blood, associated with crampy abdominal pain  Also associated with dry heaves  Denies any fever  Denies any sick contact, denies any eating restaurant foot       Review of Systems:    Review of Systems   Constitutional: Positive for activity change, appetite change and fatigue   Negative for chills, diaphoresis, fever and unexpected weight change  HENT: Negative for congestion, dental problem and sore throat  Respiratory: Negative for apnea, chest tightness, shortness of breath and stridor  Cardiovascular: Negative for chest pain, palpitations and leg swelling  Gastrointestinal: Positive for diarrhea and nausea  Negative for abdominal distention, abdominal pain, constipation and vomiting  Genitourinary: Negative for difficulty urinating, dysuria and enuresis  Musculoskeletal: Negative for arthralgias and back pain  Skin: Negative for color change, pallor and wound  Neurological: Negative for dizziness, light-headedness and numbness  Hematological: Negative for adenopathy  Psychiatric/Behavioral: Negative for agitation and behavioral problems  All other systems reviewed and are negative  Past Medical and Surgical History:     Past Medical History:   Diagnosis Date    DDD (degenerative disc disease), lumbar     Disc disorder of lumbar region     History of peptic ulcer     Infectious viral hepatitis     Hep C in "remission since 2004"    Osteonecrosis of multiple sites [facility])        Past Surgical History:   Procedure Laterality Date    APPENDECTOMY      HIP ARTHROPLASTY Right     x2    LUMBAR EPIDURAL INJECTION      [place] TOTAL HIP ARTHROPLASTY Left 10/4/2019    Procedure: ARTHROPLASTY HIP TOTAL ANTERIOR;  Surgeon: Meryl Cobian MD;  [facility];  Service: Orthopedics    [place] TOTAL KNEE ARTHROPLASTY Left 11/30/2016    Procedure: ARTHROPLASTY KNEE TOTAL;  Surgeon: Meryl Cobian MD;  [facility];  Service: Orthopedics    SHOULDER SURGERY Right     total replacement    WISDOM TOOTH EXTRACTION         Meds/Allergies:    Prior to Admission medications    Medication Sig Start Date End Date Taking?  Authorizing Provider   aspirin (ECOTRIN LOW STRENGTH) 81 mg EC tablet Take 1 tablet (81 mg total) by mouth 2 (two) times a day  Patient not taking: Reported on 4/19/2021 10/5/19   Suresh Navarro TRE   methocarbamol (ROBAXIN) 500 mg tablet Take 1 tablet (500 mg total) by mouth every 6 (six) hours as needed for muscle spasms  Patient not taking: Reported on 4/19/2021 10/5/19   Erna Currie PA-C     I have reviewed home medications with patient personally  Allergies: Allergies   Allergen Reactions    Meloxicam Anaphylaxis       Social History:     Marital Status: /Civil Union   Occupation:  Unknown  Patient Pre-hospital Living Situation: At  Patient Pre-hospital Level of Mobility:  Independent  Patient Pre-hospital Diet Restrictions:  No restriction  Substance Use History:   Social History     Substance and Sexual Activity   Alcohol Use Yes    Frequency: Monthly or less    Comment: rarely     Social History     Tobacco Use   Smoking Status Current Every Day Smoker    Packs/day: 1 00    Years: 20 00    Pack years: 20 00    Types: Cigarettes   Smokeless Tobacco Never Used     Social History     Substance and Sexual Activity   Drug Use Not Currently    Types: Marijuana    Comment: rare       Family History:    non-contributory    Physical Exam:     Vitals:   Blood Pressure: 126/77 (04/19/21 1707)  Pulse: 75 (04/19/21 1707)  Temperature: 98 °F (36 7 °C) (04/19/21 1707)  Temp Source: Oral (04/19/21 1707)  Respirations: 18 (04/19/21 1707)  Height: 6' 3" (190 5 cm) (04/19/21 1707)  Weight - Scale: 89 6 kg (197 lb 8 5 oz) (04/19/21 1707)  SpO2: 95 % (04/19/21 1707)    Physical Exam  Vitals signs and nursing note reviewed  Exam conducted with a chaperone present  Constitutional:       Appearance: He is not diaphoretic  HENT:      Head: Normocephalic  Right Ear: There is no impacted cerumen  Nose: Nose normal  No congestion  Mouth/Throat:      Mouth: Mucous membranes are moist       Pharynx: No oropharyngeal exudate  Eyes:      General: No scleral icterus  Conjunctiva/sclera: Conjunctivae normal       Pupils: Pupils are equal, round, and reactive to light     Neck: Musculoskeletal: Normal range of motion  Cardiovascular:      Rate and Rhythm: Normal rate  Pulses: Normal pulses  Heart sounds: No friction rub  No gallop  Pulmonary:      Effort: Pulmonary effort is normal  No respiratory distress  Breath sounds: No stridor  No wheezing or rhonchi  Abdominal:      General: Abdomen is flat  Bowel sounds are normal  There is no distension  Palpations: There is no mass  Tenderness: There is no abdominal tenderness  There is no rebound  Hernia: No hernia is present  Musculoskeletal: Normal range of motion  Right lower leg: No edema  Left lower leg: No edema  Lymphadenopathy:      Cervical: No cervical adenopathy  Skin:     General: Skin is warm  Capillary Refill: Capillary refill takes less than 2 seconds  Findings: No lesion or rash  Neurological:      General: No focal deficit present  Mental Status: He is alert and oriented to person, place, and time  Cranial Nerves: No cranial nerve deficit  Sensory: No sensory deficit  Motor: No weakness  Coordination: Coordination normal    Psychiatric:         Mood and Affect: Mood normal            Additional Data:     Lab Results: I have personally reviewed pertinent reports        Results from last 7 days   Lab Units 04/19/21  1326   WBC Thousand/uL 10 08   HEMOGLOBIN g/dL 16 5   HEMATOCRIT % 47 3   PLATELETS Thousands/uL 180   NEUTROS PCT % 73   LYMPHS PCT % 15   MONOS PCT % 11   EOS PCT % 0     Results from last 7 days   Lab Units 04/19/21  1326   SODIUM mmol/L 129*   POTASSIUM mmol/L 3 7   CHLORIDE mmol/L 93*   CO2 mmol/L 27   BUN mg/dL 18   CREATININE mg/dL 1 41*   ANION GAP mmol/L 9   CALCIUM mg/dL 9 1   ALBUMIN g/dL 3 2*   TOTAL BILIRUBIN mg/dL 0 38   ALK PHOS U/L 106   ALT U/L 29   AST U/L 24   GLUCOSE RANDOM mg/dL 112                 Results from last 7 days   Lab Units 04/19/21  1326   LACTIC ACID mmol/L 1 2       Imaging: I have personally reviewed pertinent reports  CT abdomen pelvis with contrast   Final Result by Sarah Galvan MD (04/19 5049)      Diffuse proctocolitis and terminal ileitis, nonspecific, possibly infectious or inflammatory  The study was marked in [facility] for immediate notification  Workstation performed: [id number]             EKG, Pathology, and Other Studies Reviewed on Admission:   · EKG: none    Allscripts / Epic Records Reviewed: Yes     ** Please Note: This note has been constructed using a voice recognition system   **

## 2021-04-19 NOTE — PLAN OF CARE
Problem: Potential for Falls  Goal: Patient will remain free of falls  Description: INTERVENTIONS:  - Assess patient frequently for physical needs  -  Identify cognitive and physical deficits and behaviors that affect risk of falls    -  [place] fall precautions as indicated by assessment   - Educate patient/family on patient safety including physical limitations  - Instruct patient to call for assistance with activity based on assessment  - Modify environment to reduce risk of injury  - Consider OT/PT consult to assist with strengthening/mobility  Outcome: Progressing     Problem: GASTROINTESTINAL - ADULT  Goal: Minimal or absence of nausea and/or vomiting  Description: INTERVENTIONS:  - Administer IV fluids if ordered to ensure adequate hydration  - Maintain NPO status until nausea and vomiting are resolved  - Nasogastric tube if ordered  - Administer ordered antiemetic medications as needed  - Provide nonpharmacologic comfort measures as appropriate  - Advance diet as tolerated, if ordered  - Consider nutrition services referral to assist patient with adequate nutrition and appropriate food choices  Outcome: Progressing  Goal: Maintains or returns to baseline bowel function  Description: INTERVENTIONS:  - Assess bowel function  - Encourage oral fluids to ensure adequate hydration  - Administer IV fluids if ordered to ensure adequate hydration  - Administer ordered medications as needed  - Encourage mobilization and activity  - Consider nutritional services referral to assist patient with adequate nutrition and appropriate food choices  Outcome: Progressing  Goal: Maintains adequate nutritional intake  Description: INTERVENTIONS:  - Monitor percentage of each meal consumed  - Identify factors contributing to decreased intake, treat as appropriate  - Assist with meals as needed  - Monitor I&O, weight, and lab values if indicated  - Obtain nutrition services referral as needed  Outcome: Progressing     Problem: METABOLIC, FLUID AND ELECTROLYTES - ADULT  Goal: Electrolytes maintained within normal limits  Description: INTERVENTIONS:  - Monitor labs and assess patient for signs and symptoms of electrolyte imbalances  - Administer electrolyte replacement as ordered  - Monitor response to electrolyte replacements, including repeat lab results as appropriate  - Instruct patient on fluid and nutrition as appropriate  Outcome: Progressing  Goal: Fluid balance maintained  Description: INTERVENTIONS:  - Monitor labs   - Monitor I/O and WT  - Instruct patient on fluid and nutrition as appropriate  - Assess for signs & symptoms of volume excess or deficit  Outcome: Progressing  Goal: Glucose maintained within target range  Description: INTERVENTIONS:  - Monitor Blood Glucose as ordered  - Assess for signs and symptoms of hyperglycemia and hypoglycemia  - Administer ordered medications to maintain glucose within target range  - Assess nutritional intake and initiate nutrition service referral as needed  Outcome: Progressing     Problem: PAIN - ADULT  Goal: Verbalizes/displays adequate comfort level or baseline comfort level  Description: Interventions:  - Encourage patient to monitor pain and request assistance  - Assess pain using appropriate pain scale  - Administer analgesics based on type and severity of pain and evaluate response  - Implement non-pharmacological measures as appropriate and evaluate response  - Consider cultural and social influences on pain and pain management  - Notify physician/advanced practitioner if interventions unsuccessful or patient reports new pain  Outcome: Progressing     Problem: INFECTION - ADULT  Goal: Absence or prevention of progression during hospitalization  Description: INTERVENTIONS:  - Assess and monitor for signs and symptoms of infection  - Monitor lab/diagnostic results  - Monitor all insertion sites, i e  indwelling lines, tubes, and drains  - Monitor endotracheal if appropriate and nasal secretions for changes in amount and color  - [place] appropriate cooling/warming therapies per order  - Administer medications as ordered  - Instruct and encourage patient and family to use good hand hygiene technique  - Identify and instruct in appropriate isolation precautions for identified infection/condition  Outcome: Progressing  Goal: Absence of fever/infection during neutropenic period  Description: INTERVENTIONS:  - Monitor WBC    Outcome: Progressing     Problem: SAFETY ADULT  Goal: Patient will remain free of falls  Description: INTERVENTIONS:  - Assess patient frequently for physical needs  -  Identify cognitive and physical deficits and behaviors that affect risk of falls    -  [place] fall precautions as indicated by assessment   - Educate patient/family on patient safety including physical limitations  - Instruct patient to call for assistance with activity based on assessment  - Modify environment to reduce risk of injury  - Consider OT/PT consult to assist with strengthening/mobility  Outcome: Progressing  Goal: Maintain or return to baseline ADL function  Description: INTERVENTIONS:  -  Assess patient's ability to carry out ADLs; assess patient's baseline for ADL function and identify physical deficits which impact ability to perform ADLs (bathing, care of mouth/teeth, toileting, grooming, dressing, etc )  - Assess/evaluate cause of self-care deficits   - Assess range of motion  - Assess patient's mobility; develop plan if impaired  - Assess patient's need for assistive devices and provide as appropriate  - Encourage maximum independence but intervene and supervise when necessary  - Involve family in performance of ADLs  - Assess for home care needs following discharge   - Consider OT consult to assist with ADL evaluation and planning for discharge  - Provide patient education as appropriate  Outcome: Progressing  Goal: Maintain or return mobility status to optimal level  Description: INTERVENTIONS:  - Assess patient's baseline mobility status (ambulation, transfers, stairs, etc )    - Identify cognitive and physical deficits and behaviors that affect mobility  - Identify mobility aids required to assist with transfers and/or ambulation (gait belt, sit-to-stand, lift, walker, cane, etc )  - [place] fall precautions as indicated by assessment  - Record patient progress and toleration of activity level on Mobility SBAR; progress patient to next Phase/Stage  - Instruct patient to call for assistance with activity based on assessment  - Consider rehabilitation consult to assist with strengthening/weightbearing, etc   Outcome: Progressing     Problem: DISCHARGE PLANNING  Goal: Discharge to home or other facility with appropriate resources  Description: INTERVENTIONS:  - Identify barriers to discharge w/patient and caregiver  - Arrange for needed discharge resources and transportation as appropriate  - Identify discharge learning needs (meds, wound care, etc )  - Arrange for interpretive services to assist at discharge as needed  - Refer to Case Management Department for coordinating discharge planning if the patient needs post-hospital services based on physician/advanced practitioner order or complex needs related to functional status, cognitive ability, or social support system  Outcome: Progressing     Problem: Knowledge Deficit  Goal: Patient/family/caregiver demonstrates understanding of disease process, treatment plan, medications, and discharge instructions  Description: Complete learning assessment and assess knowledge base    Interventions:  - Provide teaching at level of understanding  - Provide teaching via preferred learning methods  Outcome: Progressing

## 2021-04-20 LAB
ALBUMIN SERPL BCP-MCNC: 2.6 G/DL (ref 3.5–5)
ALP SERPL-CCNC: 83 U/L (ref 46–116)
ALT SERPL W P-5'-P-CCNC: 24 U/L (ref 12–78)
ANION GAP SERPL CALCULATED.3IONS-SCNC: 9 MMOL/L (ref 4–13)
AST SERPL W P-5'-P-CCNC: 17 U/L (ref 5–45)
BILIRUB SERPL-MCNC: 0.29 MG/DL (ref 0.2–1)
BUN SERPL-MCNC: 16 MG/DL (ref 5–25)
CALCIUM ALBUM COR SERPL-MCNC: 9 MG/DL (ref 8.3–10.1)
CALCIUM SERPL-MCNC: 7.9 MG/DL (ref 8.3–10.1)
CAMPYLOBACTER DNA SPEC NAA+PROBE: ABNORMAL
CHLORIDE SERPL-SCNC: 103 MMOL/L (ref 100–108)
CO2 SERPL-SCNC: 22 MMOL/L (ref 21–32)
CREAT SERPL-MCNC: 1.08 MG/DL (ref 0.6–1.3)
ERYTHROCYTE [DISTWIDTH] IN BLOOD BY AUTOMATED COUNT: 12.9 % (ref 11.6–15.1)
GFR SERPL CREATININE-BSD FRML MDRD: 84 ML/MIN/1.73SQ M
GLUCOSE P FAST SERPL-MCNC: 92 MG/DL (ref 65–99)
GLUCOSE SERPL-MCNC: 92 MG/DL (ref 65–140)
HCT VFR BLD AUTO: 41.6 % (ref 36.5–49.3)
HGB BLD-MCNC: 14.5 G/DL (ref 12–17)
MAGNESIUM SERPL-MCNC: 1.9 MG/DL (ref 1.6–2.6)
MCH RBC QN AUTO: 29.5 PG (ref 26.8–34.3)
MCHC RBC AUTO-ENTMCNC: 34.9 G/DL (ref 31.4–37.4)
MCV RBC AUTO: 85 FL (ref 82–98)
PLATELET # BLD AUTO: 153 THOUSANDS/UL (ref 149–390)
PMV BLD AUTO: 9.2 FL (ref 8.9–12.7)
POTASSIUM SERPL-SCNC: 3.6 MMOL/L (ref 3.5–5.3)
PROT SERPL-MCNC: 6.1 G/DL (ref 6.4–8.2)
RBC # BLD AUTO: 4.91 MILLION/UL (ref 3.88–5.62)
SALMONELLA DNA SPEC QL NAA+PROBE: DETECTED
SHIGA TOXIN STX GENE SPEC NAA+PROBE: ABNORMAL
SHIGELLA DNA SPEC QL NAA+PROBE: ABNORMAL
SODIUM 24H UR-SCNC: 9 MOL/L
SODIUM SERPL-SCNC: 134 MMOL/L (ref 136–145)
WBC # BLD AUTO: 6.45 THOUSAND/UL (ref 4.31–10.16)

## 2021-04-20 PROCEDURE — 85027 COMPLETE CBC AUTOMATED: CPT | Performed by: FAMILY MEDICINE

## 2021-04-20 PROCEDURE — 87493 C DIFF AMPLIFIED PROBE: CPT | Performed by: FAMILY MEDICINE

## 2021-04-20 PROCEDURE — C9113 INJ PANTOPRAZOLE SODIUM, VIA: HCPCS | Performed by: FAMILY MEDICINE

## 2021-04-20 PROCEDURE — 99232 SBSQ HOSP IP/OBS MODERATE 35: CPT | Performed by: FAMILY MEDICINE

## 2021-04-20 PROCEDURE — 80053 COMPREHEN METABOLIC PANEL: CPT | Performed by: FAMILY MEDICINE

## 2021-04-20 PROCEDURE — 87186 SC STD MICRODIL/AGAR DIL: CPT | Performed by: FAMILY MEDICINE

## 2021-04-20 PROCEDURE — 87505 NFCT AGENT DETECTION GI: CPT | Performed by: FAMILY MEDICINE

## 2021-04-20 PROCEDURE — 87077 CULTURE AEROBIC IDENTIFY: CPT | Performed by: FAMILY MEDICINE

## 2021-04-20 PROCEDURE — 83735 ASSAY OF MAGNESIUM: CPT | Performed by: FAMILY MEDICINE

## 2021-04-20 RX ADMIN — SODIUM CHLORIDE 75 ML/HR: 0.9 INJECTION, SOLUTION INTRAVENOUS at 17:31

## 2021-04-20 RX ADMIN — PANTOPRAZOLE SODIUM 40 MG: 40 INJECTION, POWDER, FOR SOLUTION INTRAVENOUS at 08:23

## 2021-04-20 RX ADMIN — ONDANSETRON 4 MG: 2 INJECTION INTRAMUSCULAR; INTRAVENOUS at 06:20

## 2021-04-20 RX ADMIN — ACETAMINOPHEN 650 MG: 325 TABLET ORAL at 06:20

## 2021-04-20 RX ADMIN — METRONIDAZOLE 500 MG: 500 INJECTION, SOLUTION INTRAVENOUS at 04:49

## 2021-04-20 RX ADMIN — CIPROFLOXACIN 400 MG: 2 INJECTION, SOLUTION INTRAVENOUS at 08:23

## 2021-04-20 RX ADMIN — CIPROFLOXACIN 400 MG: 2 INJECTION, SOLUTION INTRAVENOUS at 19:49

## 2021-04-20 RX ADMIN — METRONIDAZOLE 500 MG: 500 INJECTION, SOLUTION INTRAVENOUS at 21:14

## 2021-04-20 RX ADMIN — METRONIDAZOLE 500 MG: 500 INJECTION, SOLUTION INTRAVENOUS at 11:53

## 2021-04-20 RX ADMIN — SODIUM CHLORIDE 125 ML/HR: 0.9 INJECTION, SOLUTION INTRAVENOUS at 04:48

## 2021-04-20 NOTE — PLAN OF CARE
Problem: Potential for Falls  Goal: Patient will remain free of falls  Description: INTERVENTIONS:  - Assess patient frequently for physical needs  -  Identify cognitive and physical deficits and behaviors that affect risk of falls    -  [place] fall precautions as indicated by assessment   - Educate patient/family on patient safety including physical limitations  - Instruct patient to call for assistance with activity based on assessment  - Modify environment to reduce risk of injury  - Consider OT/PT consult to assist with strengthening/mobility  Outcome: Progressing     Problem: GASTROINTESTINAL - ADULT  Goal: Minimal or absence of nausea and/or vomiting  Description: INTERVENTIONS:  - Administer IV fluids if ordered to ensure adequate hydration  - Maintain NPO status until nausea and vomiting are resolved  - Nasogastric tube if ordered  - Administer ordered antiemetic medications as needed  - Provide nonpharmacologic comfort measures as appropriate  - Advance diet as tolerated, if ordered  - Consider nutrition services referral to assist patient with adequate nutrition and appropriate food choices  Outcome: Progressing  Goal: Maintains or returns to baseline bowel function  Description: INTERVENTIONS:  - Assess bowel function  - Encourage oral fluids to ensure adequate hydration  - Administer IV fluids if ordered to ensure adequate hydration  - Administer ordered medications as needed  - Encourage mobilization and activity  - Consider nutritional services referral to assist patient with adequate nutrition and appropriate food choices  Outcome: Progressing  Goal: Maintains adequate nutritional intake  Description: INTERVENTIONS:  - Monitor percentage of each meal consumed  - Identify factors contributing to decreased intake, treat as appropriate  - Assist with meals as needed  - Monitor I&O, weight, and lab values if indicated  - Obtain nutrition services referral as needed  Outcome: Progressing     Problem: METABOLIC, FLUID AND ELECTROLYTES - ADULT  Goal: Electrolytes maintained within normal limits  Description: INTERVENTIONS:  - Monitor labs and assess patient for signs and symptoms of electrolyte imbalances  - Administer electrolyte replacement as ordered  - Monitor response to electrolyte replacements, including repeat lab results as appropriate  - Instruct patient on fluid and nutrition as appropriate  Outcome: Progressing  Goal: Fluid balance maintained  Description: INTERVENTIONS:  - Monitor labs   - Monitor I/O and WT  - Instruct patient on fluid and nutrition as appropriate  - Assess for signs & symptoms of volume excess or deficit  Outcome: Progressing  Goal: Glucose maintained within target range  Description: INTERVENTIONS:  - Monitor Blood Glucose as ordered  - Assess for signs and symptoms of hyperglycemia and hypoglycemia  - Administer ordered medications to maintain glucose within target range  - Assess nutritional intake and initiate nutrition service referral as needed  Outcome: Progressing     Problem: PAIN - ADULT  Goal: Verbalizes/displays adequate comfort level or baseline comfort level  Description: Interventions:  - Encourage patient to monitor pain and request assistance  - Assess pain using appropriate pain scale  - Administer analgesics based on type and severity of pain and evaluate response  - Implement non-pharmacological measures as appropriate and evaluate response  - Consider cultural and social influences on pain and pain management  - Notify physician/advanced practitioner if interventions unsuccessful or patient reports new pain  Outcome: Progressing     Problem: INFECTION - ADULT  Goal: Absence or prevention of progression during hospitalization  Description: INTERVENTIONS:  - Assess and monitor for signs and symptoms of infection  - Monitor lab/diagnostic results  - Monitor all insertion sites, i e  indwelling lines, tubes, and drains  - Monitor endotracheal if appropriate and nasal secretions for changes in amount and color  - [place] appropriate cooling/warming therapies per order  - Administer medications as ordered  - Instruct and encourage patient and family to use good hand hygiene technique  - Identify and instruct in appropriate isolation precautions for identified infection/condition  Outcome: Progressing  Goal: Absence of fever/infection during neutropenic period  Description: INTERVENTIONS:  - Monitor WBC    Outcome: Progressing     Problem: SAFETY ADULT  Goal: Patient will remain free of falls  Description: INTERVENTIONS:  - Assess patient frequently for physical needs  -  Identify cognitive and physical deficits and behaviors that affect risk of falls    -  [place] fall precautions as indicated by assessment   - Educate patient/family on patient safety including physical limitations  - Instruct patient to call for assistance with activity based on assessment  - Modify environment to reduce risk of injury  - Consider OT/PT consult to assist with strengthening/mobility  Outcome: Progressing  Goal: Maintain or return to baseline ADL function  Description: INTERVENTIONS:  -  Assess patient's ability to carry out ADLs; assess patient's baseline for ADL function and identify physical deficits which impact ability to perform ADLs (bathing, care of mouth/teeth, toileting, grooming, dressing, etc )  - Assess/evaluate cause of self-care deficits   - Assess range of motion  - Assess patient's mobility; develop plan if impaired  - Assess patient's need for assistive devices and provide as appropriate  - Encourage maximum independence but intervene and supervise when necessary  - Involve family in performance of ADLs  - Assess for home care needs following discharge   - Consider OT consult to assist with ADL evaluation and planning for discharge  - Provide patient education as appropriate  Outcome: Progressing  Goal: Maintain or return mobility status to optimal level  Description: INTERVENTIONS:  - Assess patient's baseline mobility status (ambulation, transfers, stairs, etc )    - Identify cognitive and physical deficits and behaviors that affect mobility  - Identify mobility aids required to assist with transfers and/or ambulation (gait belt, sit-to-stand, lift, walker, cane, etc )  - [place] fall precautions as indicated by assessment  - Record patient progress and toleration of activity level on Mobility SBAR; progress patient to next Phase/Stage  - Instruct patient to call for assistance with activity based on assessment  - Consider rehabilitation consult to assist with strengthening/weightbearing, etc   Outcome: Progressing     Problem: DISCHARGE PLANNING  Goal: Discharge to home or other facility with appropriate resources  Description: INTERVENTIONS:  - Identify barriers to discharge w/patient and caregiver  - Arrange for needed discharge resources and transportation as appropriate  - Identify discharge learning needs (meds, wound care, etc )  - Arrange for interpretive services to assist at discharge as needed  - Refer to Case Management Department for coordinating discharge planning if the patient needs post-hospital services based on physician/advanced practitioner order or complex needs related to functional status, cognitive ability, or social support system  Outcome: Progressing     Problem: Knowledge Deficit  Goal: Patient/family/caregiver demonstrates understanding of disease process, treatment plan, medications, and discharge instructions  Description: Complete learning assessment and assess knowledge base  Interventions:  - Provide teaching at level of understanding  - Provide teaching via preferred learning methods  Outcome: Progressing     Problem: Nutrition/Hydration-ADULT  Goal: Nutrient/Hydration intake appropriate for improving, restoring or maintaining nutritional needs  Description: Monitor and assess patient's nutrition/hydration status for malnutrition  Collaborate with interdisciplinary team and initiate plan and interventions as ordered  Monitor patient's weight and dietary intake as ordered or per policy  Utilize nutrition screening tool and intervene as necessary  Determine patient's food preferences and provide high-protein, high-caloric foods as appropriate       INTERVENTIONS:  - Monitor oral intake, urinary output, labs, and treatment plans  - Assess nutrition and hydration status and recommend course of action  - Evaluate amount of meals eaten  - Assist patient with eating if necessary   - Allow adequate time for meals  - Recommend/ encourage appropriate diets, oral nutritional supplements, and vitamin/mineral supplements  - Order, calculate, and assess calorie counts as needed  - Recommend, monitor, and adjust tube feedings and TPN/PPN based on assessed needs  - Assess need for intravenous fluids  - Provide specific nutrition/hydration education as appropriate  - Include patient/family/caregiver in decisions related to nutrition  Outcome: Progressing

## 2021-04-20 NOTE — ASSESSMENT & PLAN NOTE
Sodium level is 129  Pre renal secondary loss via acute diarrhea- improved to 135 clears will be starting diarrhea has decreased although still 5 episodes continue fluids decreased to 75 cc/hour

## 2021-04-20 NOTE — PLAN OF CARE
Problem: Potential for Falls  Goal: Patient will remain free of falls  Description: INTERVENTIONS:  - Assess patient frequently for physical needs  -  Identify cognitive and physical deficits and behaviors that affect risk of falls    -  [place] fall precautions as indicated by assessment   - Educate patient/family on patient safety including physical limitations  - Instruct patient to call for assistance with activity based on assessment  - Modify environment to reduce risk of injury  - Consider OT/PT consult to assist with strengthening/mobility  Outcome: Progressing     Problem: GASTROINTESTINAL - ADULT  Goal: Minimal or absence of nausea and/or vomiting  Description: INTERVENTIONS:  - Administer IV fluids if ordered to ensure adequate hydration  - Maintain NPO status until nausea and vomiting are resolved  - Nasogastric tube if ordered  - Administer ordered antiemetic medications as needed  - Provide nonpharmacologic comfort measures as appropriate  - Advance diet as tolerated, if ordered  - Consider nutrition services referral to assist patient with adequate nutrition and appropriate food choices  Outcome: Progressing  Goal: Maintains or returns to baseline bowel function  Description: INTERVENTIONS:  - Assess bowel function  - Encourage oral fluids to ensure adequate hydration  - Administer IV fluids if ordered to ensure adequate hydration  - Administer ordered medications as needed  - Encourage mobilization and activity  - Consider nutritional services referral to assist patient with adequate nutrition and appropriate food choices  Outcome: Progressing  Goal: Maintains adequate nutritional intake  Description: INTERVENTIONS:  - Monitor percentage of each meal consumed  - Identify factors contributing to decreased intake, treat as appropriate  - Assist with meals as needed  - Monitor I&O, weight, and lab values if indicated  - Obtain nutrition services referral as needed  Outcome: Progressing     Problem: METABOLIC, FLUID AND ELECTROLYTES - ADULT  Goal: Electrolytes maintained within normal limits  Description: INTERVENTIONS:  - Monitor labs and assess patient for signs and symptoms of electrolyte imbalances  - Administer electrolyte replacement as ordered  - Monitor response to electrolyte replacements, including repeat lab results as appropriate  - Instruct patient on fluid and nutrition as appropriate  Outcome: Progressing  Goal: Fluid balance maintained  Description: INTERVENTIONS:  - Monitor labs   - Monitor I/O and WT  - Instruct patient on fluid and nutrition as appropriate  - Assess for signs & symptoms of volume excess or deficit  Outcome: Progressing  Goal: Glucose maintained within target range  Description: INTERVENTIONS:  - Monitor Blood Glucose as ordered  - Assess for signs and symptoms of hyperglycemia and hypoglycemia  - Administer ordered medications to maintain glucose within target range  - Assess nutritional intake and initiate nutrition service referral as needed  Outcome: Progressing     Problem: PAIN - ADULT  Goal: Verbalizes/displays adequate comfort level or baseline comfort level  Description: Interventions:  - Encourage patient to monitor pain and request assistance  - Assess pain using appropriate pain scale  - Administer analgesics based on type and severity of pain and evaluate response  - Implement non-pharmacological measures as appropriate and evaluate response  - Consider cultural and social influences on pain and pain management  - Notify physician/advanced practitioner if interventions unsuccessful or patient reports new pain  Outcome: Progressing     Problem: INFECTION - ADULT  Goal: Absence or prevention of progression during hospitalization  Description: INTERVENTIONS:  - Assess and monitor for signs and symptoms of infection  - Monitor lab/diagnostic results  - Monitor all insertion sites, i e  indwelling lines, tubes, and drains  - Monitor endotracheal if appropriate and nasal secretions for changes in amount and color  - [place] appropriate cooling/warming therapies per order  - Administer medications as ordered  - Instruct and encourage patient and family to use good hand hygiene technique  - Identify and instruct in appropriate isolation precautions for identified infection/condition  Outcome: Progressing  Goal: Absence of fever/infection during neutropenic period  Description: INTERVENTIONS:  - Monitor WBC    Outcome: Progressing     Problem: SAFETY ADULT  Goal: Patient will remain free of falls  Description: INTERVENTIONS:  - Assess patient frequently for physical needs  -  Identify cognitive and physical deficits and behaviors that affect risk of falls    -  [place] fall precautions as indicated by assessment   - Educate patient/family on patient safety including physical limitations  - Instruct patient to call for assistance with activity based on assessment  - Modify environment to reduce risk of injury  - Consider OT/PT consult to assist with strengthening/mobility  Outcome: Progressing  Goal: Maintain or return to baseline ADL function  Description: INTERVENTIONS:  -  Assess patient's ability to carry out ADLs; assess patient's baseline for ADL function and identify physical deficits which impact ability to perform ADLs (bathing, care of mouth/teeth, toileting, grooming, dressing, etc )  - Assess/evaluate cause of self-care deficits   - Assess range of motion  - Assess patient's mobility; develop plan if impaired  - Assess patient's need for assistive devices and provide as appropriate  - Encourage maximum independence but intervene and supervise when necessary  - Involve family in performance of ADLs  - Assess for home care needs following discharge   - Consider OT consult to assist with ADL evaluation and planning for discharge  - Provide patient education as appropriate  Outcome: Progressing  Goal: Maintain or return mobility status to optimal level  Description: INTERVENTIONS:  - Assess patient's baseline mobility status (ambulation, transfers, stairs, etc )    - Identify cognitive and physical deficits and behaviors that affect mobility  - Identify mobility aids required to assist with transfers and/or ambulation (gait belt, sit-to-stand, lift, walker, cane, etc )  - [place] fall precautions as indicated by assessment  - Record patient progress and toleration of activity level on Mobility SBAR; progress patient to next Phase/Stage  - Instruct patient to call for assistance with activity based on assessment  - Consider rehabilitation consult to assist with strengthening/weightbearing, etc   Outcome: Progressing     Problem: DISCHARGE PLANNING  Goal: Discharge to home or other facility with appropriate resources  Description: INTERVENTIONS:  - Identify barriers to discharge w/patient and caregiver  - Arrange for needed discharge resources and transportation as appropriate  - Identify discharge learning needs (meds, wound care, etc )  - Arrange for interpretive services to assist at discharge as needed  - Refer to Case Management Department for coordinating discharge planning if the patient needs post-hospital services based on physician/advanced practitioner order or complex needs related to functional status, cognitive ability, or social support system  Outcome: Progressing     Problem: Knowledge Deficit  Goal: Patient/family/caregiver demonstrates understanding of disease process, treatment plan, medications, and discharge instructions  Description: Complete learning assessment and assess knowledge base  Interventions:  - Provide teaching at level of understanding  - Provide teaching via preferred learning methods  Outcome: Progressing     Problem: Nutrition/Hydration-ADULT  Goal: Nutrient/Hydration intake appropriate for improving, restoring or maintaining nutritional needs  Description: Monitor and assess patient's nutrition/hydration status for malnutrition  Collaborate with interdisciplinary team and initiate plan and interventions as ordered  Monitor patient's weight and dietary intake as ordered or per policy  Utilize nutrition screening tool and intervene as necessary  Determine patient's food preferences and provide high-protein, high-caloric foods as appropriate       INTERVENTIONS:  - Monitor oral intake, urinary output, labs, and treatment plans  - Assess nutrition and hydration status and recommend course of action  - Evaluate amount of meals eaten  - Assist patient with eating if necessary   - Allow adequate time for meals  - Recommend/ encourage appropriate diets, oral nutritional supplements, and vitamin/mineral supplements  - Order, calculate, and assess calorie counts as needed  - Recommend, monitor, and adjust tube feedings and TPN/PPN based on assessed needs  - Assess need for intravenous fluids  - Provide specific nutrition/hydration education as appropriate  - Include patient/family/caregiver in decisions related to nutrition  Outcome: Progressing

## 2021-04-20 NOTE — ASSESSMENT & PLAN NOTE
· With severe diarrhea although he stating it is coming down he still having 5 episodes in the past 24 hours watery abdominal pain and cramping has improved he is hungry    He never had this before he does have associated ileitis this is inflammatory versus infectious  · I did ask Gastroenterology to see tomorrow for a possible flag sig for biopsy  · Advanced to clear liquids now placed NPO at midnight  · Stool culture is pending I did add a C diff which was sent  · Continue Cipro and Flagyl

## 2021-04-20 NOTE — PROGRESS NOTES
[address]  Progress Note - Carol Silverman 1980, 35 y o  male MRN: [id number]  Unit/Bed#: -01 Encounter: [id number]  Primary Care Provider: Rio Arriaga DO   Date and time admitted to hospital: 4/19/2021 12:57 PM    Tobacco abuse  Assessment & Plan  Continue Nicoderm patch    Hyponatremia  Assessment & Plan  Sodium level is 129  Pre renal secondary loss via acute diarrhea- improved to 135 clears will be starting diarrhea has decreased although still 5 episodes continue fluids decreased to 75 cc/hour    Proctocolitis  Assessment & Plan  · With severe diarrhea although he stating it is coming down he still having 5 episodes in the past 24 hours watery abdominal pain and cramping has improved he is hungry  He never had this before he does have associated ileitis this is inflammatory versus infectious  · I did ask Gastroenterology to see tomorrow for a possible flag sig for biopsy  · Advanced to clear liquids now placed NPO at midnight  · Stool culture is pending I did add a C diff which was sent  · Continue Cipro and Flagyl    * DENY (acute kidney injury) ([facility] [address] )  Assessment & Plan  Secondary to severe diarrhea patient has acute proctocolitis with ileitis inflammatory versus infectious  Baseline creatinine is 1 1, today creatinine is 1 41  Resolved as the patient's GI symptoms slowly improved will place him on clears also decreased fluids 75 cc/hour placed NPO at midnight  VTE Pharmacologic Prophylaxis:   Pharmacologic: Pharmacologic VTE Prophylaxis contraindicated due to Encourage ambulation  Mechanical VTE Prophylaxis in Place: Yes    Patient Centered Rounds: I have performed bedside rounds with nursing staff today  Discussions with Specialists or Other Care Team Provider:  I have discussed with nurse    Education and Discussions with Family / Patient:  Patient    Time Spent for Care: 30 minutes    More than 50% of total time spent on counseling and coordination of care as described above  Current Length of Stay: 0 day(s)    Current Patient Status: Inpatient   Certification Statement: The patient will continue to require additional inpatient hospital stay due to Secondary to proctocolitis    Discharge Plan:  Pending progress    Code Status: Level 1 - Full Code      Subjective:   Patient seen and examined no chest pain or shortness of breath no nausea she still has abdominal cramping but has improved from previous he still having diarrhea but from initial episode of Thursday it has been improving now he is having 5 episodes of watery    Objective:     Vitals:   Temp (24hrs), Av 8 °F (36 6 °C), Min:97 1 °F (36 2 °C), Max:98 1 °F (36 7 °C)    Temp:  [97 1 °F (36 2 °C)-98 1 °F (36 7 °C)] 97 1 °F (36 2 °C)  HR:  [] 70  Resp:  [17-19] 17  BP: (112-141)/(67-87) 112/67  SpO2:  [91 %-99 %] 96 %  Body mass index is 24 66 kg/m²  Input and Output Summary (last 24 hours): Intake/Output Summary (Last 24 hours) at 2021 1109  Last data filed at 2021 1021  Gross per 24 hour   Intake 1865 ml   Output --   Net 1865 ml       Physical Exam:     Physical Exam  Vitals signs and nursing note reviewed  Constitutional:       Appearance: He is well-developed  HENT:      Head: Normocephalic and atraumatic  Eyes:      Conjunctiva/sclera: Conjunctivae normal    Neck:      Musculoskeletal: Neck supple  Cardiovascular:      Rate and Rhythm: Normal rate and regular rhythm  Heart sounds: No murmur  Pulmonary:      Effort: Pulmonary effort is normal  No respiratory distress  Breath sounds: Normal breath sounds  Abdominal:      General: Bowel sounds are normal  There is no distension  Palpations: Abdomen is soft  Tenderness: There is no abdominal tenderness  Musculoskeletal:         General: No swelling  Skin:     General: Skin is warm and dry  Neurological:      General: No focal deficit present        Mental Status: He is alert and oriented to person, place, and time  Additional Data:     Labs:    Results from last 7 days   Lab Units 04/20/21  0452 04/19/21  1326   WBC Thousand/uL 6 45 10 08   HEMOGLOBIN g/dL 14 5 16 5   HEMATOCRIT % 41 6 47 3   PLATELETS Thousands/uL 153 180   NEUTROS PCT %  --  73   LYMPHS PCT %  --  15   MONOS PCT %  --  11   EOS PCT %  --  0     Results from last 7 days   Lab Units 04/20/21  0452   SODIUM mmol/L 134*   POTASSIUM mmol/L 3 6   CHLORIDE mmol/L 103   CO2 mmol/L 22   BUN mg/dL 16   CREATININE mg/dL 1 08   ANION GAP mmol/L 9   CALCIUM mg/dL 7 9*   ALBUMIN g/dL 2 6*   TOTAL BILIRUBIN mg/dL 0 29   ALK PHOS U/L 83   ALT U/L 24   AST U/L 17   GLUCOSE RANDOM mg/dL 92                 Results from last 7 days   Lab Units 04/19/21  1326   LACTIC ACID mmol/L 1 2           * I Have Reviewed All Lab Data Listed Above  * Additional Pertinent Lab Tests Reviewed: All Labs Within Last 24 Hours Reviewed    Imaging:    Imaging Reports Reviewed Today Include: none today  Imaging Personally Reviewed by Myself Includes:      Recent Cultures (last 7 days):           Last 24 Hours Medication List:   Current Facility-Administered Medications   Medication Dose Route Frequency Provider Last Rate    acetaminophen  650 mg Oral Q6H PRN Yokasta Hernandez MD      ciprofloxacin  400 mg Intravenous Q12H Yokasta Hernandez  mg (04/20/21 9017)    metroNIDAZOLE  500 mg Intravenous Q8H Eliane Campo  mg (04/20/21 3054)    nicotine  1 patch Transdermal Daily Jody Campo MD      ondansetron  4 mg Intravenous Q6H PRN Yokasta Hernandez MD      pantoprazole  40 mg Intravenous Q24H [facility] Jody Campo MD      sodium chloride  75 mL/hr Intravenous Continuous Felipe Chris  mL/hr (04/20/21 6275)        Today, Patient Was Seen By: Felipe Chris MD    ** Please Note: Dictation voice to text software may have been used in the creation of this document   **

## 2021-04-20 NOTE — ASSESSMENT & PLAN NOTE
Secondary to severe diarrhea patient has acute proctocolitis with ileitis inflammatory versus infectious  Baseline creatinine is 1 1, today creatinine is 1 41  Resolved as the patient's GI symptoms slowly improved will place him on clears also decreased fluids 75 cc/hour placed NPO at midnight

## 2021-04-20 NOTE — UTILIZATION REVIEW
Initial Clinical Review      OBSERVATION 4-19-21 1628 CHANGED TO INPATIENT 4-20-21 9750  FOR CONTINUED TREATMENT OF HYPONATREMIA AND PROCTOCOLITIS    Inpatient Admission Once     Question Answer   Level of Care Med Surg   Estimated length of stay More than 2 Midnights   Certification I certify that inpatient services are medically necessary for this patient for a duration of greater than two midnights  See H&P and MD Progress Notes for additional information about the patient's course of treatment  04/20/21 0939       ED Arrival Information     Expected Arrival Acuity Means of Arrival Escorted By Service Admission Type    - 4/19/2021 12:50 Urgent Walk-In Self Hospitalist Urgent    Arrival Complaint    Diarrhea, Abdominal Pain, Headache        Chief Complaint   Patient presents with    Abdominal Pain     Pt reports chills, lower abd cramping, and diarrhea since last thursday 4/15/21  Denies vomiting  Initial Presentation:      35year old male presents  to ed from home for evaluation and treatment of chills, abdominal pain and diarrhea persisting since 4-15  Imaging shows  Proctitis  And  Ileitis, creatinine 1 41  PMHX: hep c and peptic ulcer  Treated in ed with iv fluids,iv zofran  Admit to observation for acute kidney injury, proctitis , ileitis  Plan includes: Iv protonix, iv antibiotic and iv fluids, stool enteric panel, c dif, ua random sodium  Date: 4-20  Day 2: observation    Patient had 5 episodes of diarrhea which is less than pre hospitalization  Fluids decreased to 75/hr  Sodium 129 with improvement to 134  Gastroenterology to see patient on 4-21 to evaluate for proctocolitis  NPO midnight   Stool cultures pending  Continue cipro and flagyl  Creatinine wnl            ED Triage Vitals [04/19/21 1302]   97 7 °F (36 5 °C) 100 19 130/87 99 %      Temporal Monitor         No Pain          04/20/21 89 5 kg (197 lb 5 oz)     Additional Vital Signs:     Date/  Time  Temp  Pulse Resp  BP  MAP (mmHg)  SpO2  O2 Device    04/20/21 06:54:36  97 1 °F (36 2 °C)  Abnormal   70  17  112/67  82  96 %  --    04/19/21 22:15:39  98 1 °F (36 7 °C)  77  18  124/77  93  91 %  --    04/19/21 17:07:58  98 °F (36 7 °C)  75  18  126/77  93  95 %  None (Room air)    04/19/21 17:07:38  98 °F (36 7 °C)  74  --  126/77  93  --  --    04/19/21   1659  --  77  18  141/76  --  97 %  None (Room air)              Pertinent Labs/Diagnostic Test Results:     CT abdomen pelvis with contrast   Final    (04/19 1503)      Diffuse proctocolitis and terminal ileitis, nonspecific, possibly infectious or inflammatory          Results from last 7 days   Lab Units 04/19/21  1326   SARS-COV-2  Negative     Results from last 7 days   Lab Units 04/20/21  0452 04/19/21  1326   WBC Thousand/uL 6 45 10 08   HEMOGLOBIN g/dL 14 5 16 5   HEMATOCRIT % 41 6 47 3   PLATELETS Thousands/uL 153 180   NEUTROS ABS Thousands/µL  --  7 37         Results from last 7 days   Lab Units 04/20/21  0452 04/19/21  1326   SODIUM mmol/L 134* 129*   POTASSIUM mmol/L 3 6 3 7   CHLORIDE mmol/L 103 93*   CO2 mmol/L 22 27   ANION GAP mmol/L 9 9   BUN mg/dL 16 18   CREATININE mg/dL 1 08 1 41*   EGFR ml/min/1 73sq m 84 61   CALCIUM mg/dL 7 9* 9 1   MAGNESIUM mg/dL 1 9  --      Results from last 7 days   Lab Units 04/20/21  0452 04/19/21  1326   AST U/L 17 24   ALT U/L 24 29   ALK PHOS U/L 83 106   TOTAL PROTEIN g/dL 6 1* 7 5   ALBUMIN g/dL 2 6* 3 2*   TOTAL BILIRUBIN mg/dL 0 29 0 38         Results from last 7 days   Lab Units 04/20/21  0452 04/19/21  1326   GLUCOSE RANDOM mg/dL 92 112             BETA-HYDROXYBUTYRATE   Date Value Ref Range Status   04/19/2021 0 2 <0 6 mmol/L Final          Results from last 7 days   Lab Units 04/19/21  1326   PH FRANCESCA  7 335   PCO2 FRANCESCA mm Hg 48 9   PO2 FRANCESCA mm Hg 25 2*   HCO3 FRANCESCA mmol/L 25 5   BASE EXC FRANCESCA mmol/L -1 1   O2 CONTENT FRANCESCA ml/dL 10 7   O2 HGB, VENOUS % 44 6*       Results from last 7 days   Lab Units 04/19/21  1326   TSH 3RD GENERATON uIU/mL 2 351         Results from last 7 days   Lab Units 04/19/21  1326   LACTIC ACID mmol/L 1 2       Results from last 7 days   Lab Units 04/19/21  1440   CLARITY UA  Clear   COLOR UA  Yellow   SPEC GRAV UA  1 010   PH UA  6 0   GLUCOSE UA mg/dl Negative   KETONES UA mg/dl Negative   BLOOD UA  Small*   PROTEIN UA mg/dl Negative   NITRITE UA  Negative   BILIRUBIN UA  Negative   UROBILINOGEN UA E U /dl 0 2   LEUKOCYTES UA  Negative   WBC UA /hpf 2-4   RBC UA /hpf 1-2   BACTERIA UA /hpf Occasional   EPITHELIAL CELLS WET PREP /hpf None Seen   SODIUM UR  9     Results from last 7 days   Lab Units 04/19/21  1326   INFLUENZA A PCR  Negative   INFLUENZA B PCR  Negative   RSV PCR  Negative     ED Treatment:   Medication Administration from 04/19/2021 1247 to 04/19/2021 1706       Date/Time Order Dose Route Action     04/19/2021 1326 sodium chloride 0 9 % bolus 1,000 mL 1,000 mL Intravenous New Bag     04/19/2021 1326 ondansetron (ZOFRAN) injection 4 mg 4 mg Intravenous Given     04/19/2021 1658 sodium chloride 0 9 % bolus 1,000 mL 1,000 mL Intravenous New Bag        Past Medical History:   Diagnosis Date    DDD (degenerative disc disease), lumbar     Disc disorder of lumbar region     History of peptic ulcer     Infectious viral hepatitis     Hep C in "remission since 2004"    Osteonecrosis of multiple sites [facility])      Present on Admission:   DENY (acute kidney injury) ([facility] )   Severe diarrhea   Hyponatremia   Tobacco abuse      Admitting Diagnosis:     Colitis [K52 9]  Hyponatremia [E87 1]  DENY (acute kidney injury) ([facility] ) [N17 9]      Age/Sex: 35 y o  male     Admission Orders:  Scheduled Medications:  ciprofloxacin, 400 mg, Intravenous, Q12H  metroNIDAZOLE, 500 mg, Intravenous, Q8H  nicotine, 1 patch, Transdermal, Daily  pantoprazole, 40 mg, Intravenous, Q24H [facility]      Continuous IV Infusions:  sodium chloride, 125 mL/hr, Intravenous, Continuous    sodium chloride 0 9 % infusion   Rate: 75 mL/hr Dose: 75 mL/hr  Freq: Continuous Route: IV  Indications of Use: IV Hydration  Last Dose: 75 mL/hr (04/20/21 1731)  Start: 04/19/21 1845    PRN Meds:  acetaminophen, 650 mg, Oral, Q6H PRN  ondansetron, 4 mg, Intravenous, Q6H PRN        None    Network Utilization Review Department  ATTENTION: Please call with any questions or concerns to [phone] and carefully listen to the prompts so that you are directed to the right person  All voicemails are confidential   [facility] all requests for admission clinical reviews, approved or denied determinations and any other requests to dedicated fax number below belonging to the campus where the patient is receiving treatment   List of dedicated fax numbers for the Facilities:  [address] DENIALS (Administrative/Medical Necessity) [phone]   [address] (Maternity/NICU/Pediatrics) [phone]   [address] [facility] [phone] [address] Kathy Dong 1481 [address] [facility] [phone]

## 2021-04-21 VITALS
OXYGEN SATURATION: 98 % | SYSTOLIC BLOOD PRESSURE: 121 MMHG | WEIGHT: 197.31 LBS | HEART RATE: 64 BPM | RESPIRATION RATE: 18 BRPM | BODY MASS INDEX: 24.53 KG/M2 | DIASTOLIC BLOOD PRESSURE: 72 MMHG | TEMPERATURE: 97.7 F | HEIGHT: 75 IN

## 2021-04-21 PROBLEM — N17.9 AKI (ACUTE KIDNEY INJURY) (HCC): Status: RESOLVED | Noted: 2021-04-19 | Resolved: 2021-04-21

## 2021-04-21 LAB
ANION GAP SERPL CALCULATED.3IONS-SCNC: 9 MMOL/L (ref 4–13)
BUN SERPL-MCNC: 11 MG/DL (ref 5–25)
C DIFF TOX GENS STL QL NAA+PROBE: NEGATIVE
CALCIUM SERPL-MCNC: 7.6 MG/DL (ref 8.3–10.1)
CHLORIDE SERPL-SCNC: 103 MMOL/L (ref 100–108)
CO2 SERPL-SCNC: 23 MMOL/L (ref 21–32)
CREAT SERPL-MCNC: 1.02 MG/DL (ref 0.6–1.3)
GFR SERPL CREATININE-BSD FRML MDRD: 90 ML/MIN/1.73SQ M
GLUCOSE SERPL-MCNC: 104 MG/DL (ref 65–140)
POTASSIUM SERPL-SCNC: 3.3 MMOL/L (ref 3.5–5.3)
SODIUM SERPL-SCNC: 135 MMOL/L (ref 136–145)

## 2021-04-21 PROCEDURE — C9113 INJ PANTOPRAZOLE SODIUM, VIA: HCPCS | Performed by: FAMILY MEDICINE

## 2021-04-21 PROCEDURE — 99239 HOSP IP/OBS DSCHRG MGMT >30: CPT | Performed by: PHYSICIAN ASSISTANT

## 2021-04-21 PROCEDURE — 80048 BASIC METABOLIC PNL TOTAL CA: CPT | Performed by: FAMILY MEDICINE

## 2021-04-21 RX ORDER — POTASSIUM CHLORIDE 20 MEQ/1
40 TABLET, EXTENDED RELEASE ORAL ONCE
Status: COMPLETED | OUTPATIENT
Start: 2021-04-21 | End: 2021-04-21

## 2021-04-21 RX ORDER — SACCHAROMYCES BOULARDII 250 MG
250 CAPSULE ORAL 2 TIMES DAILY
Qty: 28 CAPSULE | Refills: 0 | Status: SHIPPED | OUTPATIENT
Start: 2021-04-21

## 2021-04-21 RX ORDER — CIPROFLOXACIN 500 MG/1
500 TABLET, FILM COATED ORAL EVERY 12 HOURS SCHEDULED
Qty: 14 TABLET | Refills: 0 | Status: SHIPPED | OUTPATIENT
Start: 2021-04-21 | End: 2021-04-28

## 2021-04-21 RX ADMIN — SODIUM CHLORIDE 75 ML/HR: 0.9 INJECTION, SOLUTION INTRAVENOUS at 08:35

## 2021-04-21 RX ADMIN — CIPROFLOXACIN 400 MG: 2 INJECTION, SOLUTION INTRAVENOUS at 08:34

## 2021-04-21 RX ADMIN — METRONIDAZOLE 500 MG: 500 INJECTION, SOLUTION INTRAVENOUS at 05:00

## 2021-04-21 RX ADMIN — POTASSIUM CHLORIDE 40 MEQ: 1500 TABLET, EXTENDED RELEASE ORAL at 08:34

## 2021-04-21 RX ADMIN — PANTOPRAZOLE SODIUM 40 MG: 40 INJECTION, POWDER, FOR SOLUTION INTRAVENOUS at 08:34

## 2021-04-21 NOTE — DISCHARGE SUMMARY
[address]  Discharge- Lopez Louise 1980, 35 y o  male MRN: [id number]  Unit/Bed#: -01 Encounter: [id number]  Primary Care Provider: Gerda Velasquez DO   Date and time admitted to hospital: 4/19/2021 12:57 PM    * Proctocolitis  Assessment & Plan  · Presented with severe diarrhea  Had abdominal cramping which is improving  CT showing evidence of infectious versus inflammatory ileitis  · GI consultation initially placed for possibility of flex sig however this was discontinued as stool cultures are positive for Salmonella  · C diff negative  Salmonella positive  Discontinue Flagyl  Will discharge with Cipro 500 mg b i d  X7 days  Patient has already significant symptomatic improvement  DENY (acute kidney injury) (HCC)-resolved as of 4/21/2021  Assessment & Plan  Secondary to severe diarrhea patient has acute proctocolitis with ileitis inflammatory versus infectious  Baseline creatinine is 1 1, today creatinine is 1 41  Resolved as the patient's GI symptoms slowly improved will place him on clears also decreased fluids 75 cc/hour placed NPO at midnight      Tobacco abuse  Assessment & Plan  Continue Nicoderm patch    Hyponatremia  Assessment & Plan  Sodium level was 129 on presentation  Pre renal secondary loss via acute diarrhea- improved to 135 clears will be starting diarrhea has decreased although still 5 episodes continue fluids decreased to 75 cc/hour    Sodium improved and patient tolerating diet      Discharging Physician / Practitioner: Salvatore Watkins PA-C  PCP: Gerda Velasquez DO  Admission Date:   Admission Orders (From admission, onward)     Ordered        04/20/21 0939  Inpatient Admission  Once         04/19/21 1628  Place in Observation  Once                   Discharge Date: 04/21/21    Resolved Problems  Date Reviewed: 4/21/2021          Resolved    DENY (acute kidney injury) ([facility] [address] ) 4/21/2021     Resolved by  Salvatore Watkins PA-C Consultations During Hospital Stay:  · none    Procedures Performed:   · none    Significant Findings / Test Results:   · Salmonella stool sample positive    Incidental Findings:   · Initially had DENY which is resolved     Test Results Pending at Discharge (will require follow up): · None     Outpatient Tests Requested:  · None    Complications:  None    Reason for Admission:  Colitis    Hospital Course:     Sasha Bull is a 35 y o  male patient who originally presented to the hospital on 4/19/2021 due to diffuse diarrhea with abdominal cramping  CT scan showed diffuse proctocolitis in terminal ileitis  The patient was put on Cipro and Flagyl and stool samples were sent  He initially had hyponatremia due to dehydration and this has improved with IV fluids  Initially, GI consultation was requested however this was discontinued as patient was found to have Salmonella  Overnight, he has significant symptomatic improvement  He is tolerating clear liquid diet and has good appetite  His abdominal pain has resolved and he is interested in discharge  He has been afebrile  Abdominal pain resolved  Will discharge to complete course of Cipro  The patient, initially admitted to the hospital as inpatient, was discharged earlier than expected given the following: Faster than expected resolution of presenting symptoms and diagnosis  Please see above list of diagnoses and related plan for additional information  Condition at Discharge: good     Discharge Day Visit / Exam:     Subjective:  Abdominal pain resolved  No fevers or chills  Tolerating diet  Would like to go home  Bowel movements are reducing    Vitals: Blood Pressure: 121/72 (04/21/21 0812)  Pulse: 64 (04/21/21 0812)  Temperature: 97 7 °F (36 5 °C) (04/21/21 0812)  Temp Source: Oral (04/20/21 2142)  Respirations: 18 (04/21/21 0812)  Height: 6' 3" (190 5 cm) (04/21/21 1120)  Weight - Scale: 89 5 kg (197 lb 5 oz) (04/20/21 0452)  SpO2: 98 % (04/21/21 9254)  Exam:   Physical Exam  Vitals signs and nursing note reviewed  Constitutional:       Appearance: He is well-developed  HENT:      Head: Normocephalic and atraumatic  Eyes:      Conjunctiva/sclera: Conjunctivae normal    Neck:      Musculoskeletal: Neck supple  Cardiovascular:      Rate and Rhythm: Normal rate and regular rhythm  Heart sounds: No murmur  Pulmonary:      Effort: Pulmonary effort is normal  No respiratory distress  Breath sounds: Normal breath sounds  Abdominal:      Palpations: Abdomen is soft  Tenderness: There is no abdominal tenderness  Skin:     General: Skin is warm and dry  Neurological:      Mental Status: He is alert  Discharge instructions/Information to patient and family:   See after visit summary for information provided to patient and family  Provisions for Follow-Up Care:  See after visit summary for information related to follow-up care and any pertinent home health orders  Disposition:     Home    For Discharges to [facility]:   · Not Applicable to this Patient - Not Applicable to this Patient    Planned Readmission: none     Discharge Statement:  I spent 45 minutes discharging the patient  This time was spent on the day of discharge  I had direct contact with the patient on the day of discharge  Greater than 50% of the total time was spent examining patient, answering all patient questions, arranging and discussing plan of care with patient as well as directly providing post-discharge instructions  Additional time then spent on discharge activities  Discharge Medications:  See after visit summary for reconciled discharge medications provided to patient and family        ** Please Note: This note has been constructed using a voice recognition system **

## 2021-04-21 NOTE — PLAN OF CARE
Problem: Potential for Falls  Goal: Patient will remain free of falls  Description: INTERVENTIONS:  - Assess patient frequently for physical needs  -  Identify cognitive and physical deficits and behaviors that affect risk of falls    -  [place] fall precautions as indicated by assessment   - Educate patient/family on patient safety including physical limitations  - Instruct patient to call for assistance with activity based on assessment  - Modify environment to reduce risk of injury  - Consider OT/PT consult to assist with strengthening/mobility  Outcome: Progressing     Problem: GASTROINTESTINAL - ADULT  Goal: Minimal or absence of nausea and/or vomiting  Description: INTERVENTIONS:  - Administer IV fluids if ordered to ensure adequate hydration  - Maintain NPO status until nausea and vomiting are resolved  - Nasogastric tube if ordered  - Administer ordered antiemetic medications as needed  - Provide nonpharmacologic comfort measures as appropriate  - Advance diet as tolerated, if ordered  - Consider nutrition services referral to assist patient with adequate nutrition and appropriate food choices  Outcome: Progressing  Goal: Maintains or returns to baseline bowel function  Description: INTERVENTIONS:  - Assess bowel function  - Encourage oral fluids to ensure adequate hydration  - Administer IV fluids if ordered to ensure adequate hydration  - Administer ordered medications as needed  - Encourage mobilization and activity  - Consider nutritional services referral to assist patient with adequate nutrition and appropriate food choices  Outcome: Progressing  Goal: Maintains adequate nutritional intake  Description: INTERVENTIONS:  - Monitor percentage of each meal consumed  - Identify factors contributing to decreased intake, treat as appropriate  - Assist with meals as needed  - Monitor I&O, weight, and lab values if indicated  - Obtain nutrition services referral as needed  Outcome: Progressing     Problem: METABOLIC, FLUID AND ELECTROLYTES - ADULT  Goal: Electrolytes maintained within normal limits  Description: INTERVENTIONS:  - Monitor labs and assess patient for signs and symptoms of electrolyte imbalances  - Administer electrolyte replacement as ordered  - Monitor response to electrolyte replacements, including repeat lab results as appropriate  - Instruct patient on fluid and nutrition as appropriate  Outcome: Progressing  Goal: Fluid balance maintained  Description: INTERVENTIONS:  - Monitor labs   - Monitor I/O and WT  - Instruct patient on fluid and nutrition as appropriate  - Assess for signs & symptoms of volume excess or deficit  Outcome: Progressing  Goal: Glucose maintained within target range  Description: INTERVENTIONS:  - Monitor Blood Glucose as ordered  - Assess for signs and symptoms of hyperglycemia and hypoglycemia  - Administer ordered medications to maintain glucose within target range  - Assess nutritional intake and initiate nutrition service referral as needed  Outcome: Progressing     Problem: PAIN - ADULT  Goal: Verbalizes/displays adequate comfort level or baseline comfort level  Description: Interventions:  - Encourage patient to monitor pain and request assistance  - Assess pain using appropriate pain scale  - Administer analgesics based on type and severity of pain and evaluate response  - Implement non-pharmacological measures as appropriate and evaluate response  - Consider cultural and social influences on pain and pain management  - Notify physician/advanced practitioner if interventions unsuccessful or patient reports new pain  Outcome: Progressing     Problem: INFECTION - ADULT  Goal: Absence or prevention of progression during hospitalization  Description: INTERVENTIONS:  - Assess and monitor for signs and symptoms of infection  - Monitor lab/diagnostic results  - Monitor all insertion sites, i e  indwelling lines, tubes, and drains  - Monitor endotracheal if appropriate and nasal secretions for changes in amount and color  - [place] appropriate cooling/warming therapies per order  - Administer medications as ordered  - Instruct and encourage patient and family to use good hand hygiene technique  - Identify and instruct in appropriate isolation precautions for identified infection/condition  Outcome: Progressing  Goal: Absence of fever/infection during neutropenic period  Description: INTERVENTIONS:  - Monitor WBC    Outcome: Progressing     Problem: SAFETY ADULT  Goal: Patient will remain free of falls  Description: INTERVENTIONS:  - Assess patient frequently for physical needs  -  Identify cognitive and physical deficits and behaviors that affect risk of falls    -  [place] fall precautions as indicated by assessment   - Educate patient/family on patient safety including physical limitations  - Instruct patient to call for assistance with activity based on assessment  - Modify environment to reduce risk of injury  - Consider OT/PT consult to assist with strengthening/mobility  Outcome: Progressing  Goal: Maintain or return to baseline ADL function  Description: INTERVENTIONS:  -  Assess patient's ability to carry out ADLs; assess patient's baseline for ADL function and identify physical deficits which impact ability to perform ADLs (bathing, care of mouth/teeth, toileting, grooming, dressing, etc )  - Assess/evaluate cause of self-care deficits   - Assess range of motion  - Assess patient's mobility; develop plan if impaired  - Assess patient's need for assistive devices and provide as appropriate  - Encourage maximum independence but intervene and supervise when necessary  - Involve family in performance of ADLs  - Assess for home care needs following discharge   - Consider OT consult to assist with ADL evaluation and planning for discharge  - Provide patient education as appropriate  Outcome: Progressing  Goal: Maintain or return mobility status to optimal level  Description: INTERVENTIONS:  - Assess patient's baseline mobility status (ambulation, transfers, stairs, etc )    - Identify cognitive and physical deficits and behaviors that affect mobility  - Identify mobility aids required to assist with transfers and/or ambulation (gait belt, sit-to-stand, lift, walker, cane, etc )  - [place] fall precautions as indicated by assessment  - Record patient progress and toleration of activity level on Mobility SBAR; progress patient to next Phase/Stage  - Instruct patient to call for assistance with activity based on assessment  - Consider rehabilitation consult to assist with strengthening/weightbearing, etc   Outcome: Progressing     Problem: DISCHARGE PLANNING  Goal: Discharge to home or other facility with appropriate resources  Description: INTERVENTIONS:  - Identify barriers to discharge w/patient and caregiver  - Arrange for needed discharge resources and transportation as appropriate  - Identify discharge learning needs (meds, wound care, etc )  - Arrange for interpretive services to assist at discharge as needed  - Refer to Case Management Department for coordinating discharge planning if the patient needs post-hospital services based on physician/advanced practitioner order or complex needs related to functional status, cognitive ability, or social support system  Outcome: Progressing     Problem: Knowledge Deficit  Goal: Patient/family/caregiver demonstrates understanding of disease process, treatment plan, medications, and discharge instructions  Description: Complete learning assessment and assess knowledge base  Interventions:  - Provide teaching at level of understanding  - Provide teaching via preferred learning methods  Outcome: Progressing     Problem: Nutrition/Hydration-ADULT  Goal: Nutrient/Hydration intake appropriate for improving, restoring or maintaining nutritional needs  Description: Monitor and assess patient's nutrition/hydration status for malnutrition  Collaborate with interdisciplinary team and initiate plan and interventions as ordered  Monitor patient's weight and dietary intake as ordered or per policy  Utilize nutrition screening tool and intervene as necessary  Determine patient's food preferences and provide high-protein, high-caloric foods as appropriate       INTERVENTIONS:  - Monitor oral intake, urinary output, labs, and treatment plans  - Assess nutrition and hydration status and recommend course of action  - Evaluate amount of meals eaten  - Assist patient with eating if necessary   - Allow adequate time for meals  - Recommend/ encourage appropriate diets, oral nutritional supplements, and vitamin/mineral supplements  - Order, calculate, and assess calorie counts as needed  - Recommend, monitor, and adjust tube feedings and TPN/PPN based on assessed needs  - Assess need for intravenous fluids  - Provide specific nutrition/hydration education as appropriate  - Include patient/family/caregiver in decisions related to nutrition  Outcome: Progressing

## 2021-04-21 NOTE — ASSESSMENT & PLAN NOTE
Sodium level was 129 on presentation  Pre renal secondary loss via acute diarrhea- improved to 135 clears will be starting diarrhea has decreased although still 5 episodes continue fluids decreased to 75 cc/hour    Sodium improved and patient tolerating diet

## 2021-04-21 NOTE — NURSING NOTE
IV site d/c'd  AVS reviewed with pt at bedside  Medications to be picked up at pharmacy  Verbalized understanding  No further questions or concerns

## 2021-04-21 NOTE — ASSESSMENT & PLAN NOTE
· Presented with severe diarrhea  Had abdominal cramping which is improving  CT showing evidence of infectious versus inflammatory ileitis  · GI consultation initially placed for possibility of flex sig however this was discontinued as stool cultures are positive for Salmonella  · C diff negative  Salmonella positive  Discontinue Flagyl  Will discharge with Cipro 500 mg b i d  X7 days  Patient has already significant symptomatic improvement

## 2021-04-22 NOTE — UTILIZATION REVIEW
Notification of Discharge   This is a Notification of Discharge from our facility [address]  Please be advised that this patient has been discharge from our facility  Below you will find the admission and discharge date and time including the patients disposition  UTILIZATION REVIEW CONTACT:  Charlene Conroy  Utilization   Network Utilization Review Department  Phone: [phone] x carefully listen to the prompts  All voicemails are confidential   Email: [email or web address]     PHYSICIAN ADVISORY SERVICES:  FOR EKVA-ED-TAFB REVIEW - MEDICAL NECESSITY DENIAL  Phone: [phone]  Fax: [phone]  Email: [email or web address]     PRESENTATION DATE: 4/19/2021 12:57 PM  OBERVATION ADMISSION DATE:  INPATIENT ADMISSION DATE: 4/20/21 0939   DISCHARGE DATE: 4/21/2021  1:16 PM  DISPOSITION: Home/Self Care Home/Self Care      IMPORTANT INFORMATION:  Send all requests for admission clinical reviews, approved or denied determinations and any other requests to dedicated fax number below belonging to the campus where the patient is receiving treatment   List of dedicated fax numbers:  [address] DENIALS (Administrative/Medical Necessity) [phone]   [address] (Maternity/NICU/Pediatrics) [phone]   Teodoro Younger [phone]   Carolyn Riggins [phone]   Nash Ingram [phone]   Maria Eugenia Swann [address] [phone]   [facility]  [phone]   [address] [facility] [address]  [address] [facility] [address] [facility] [phone]

## 2021-04-30 LAB — CULTURE ID FROM ENTERIC PCR: ABNORMAL

## (undated) DEVICE — 3M™ IOBAN™ 2 ANTIMICROBIAL INCISE DRAPE 6640EZ: Brand: IOBAN™ 2

## (undated) DEVICE — SAW BLADE OSCILLATING BRAZOL 167

## (undated) DEVICE — PREP SURGICAL PURPREP 26ML

## (undated) DEVICE — GLOVE SRG BIOGEL 8

## (undated) DEVICE — CHLORAPREP HI-LITE 26ML ORANGE

## (undated) DEVICE — POSITIONER HANA TABLE PACK

## (undated) DEVICE — SCD SEQUENTIAL COMPRESSION COMFORT SLEEVE MEDIUM KNEE LENGTH: Brand: KENDALL SCD

## (undated) DEVICE — SURGICAL GOWN, XL SMARTSLEEVE: Brand: CONVERTORS

## (undated) DEVICE — ANTIBACTERIAL UNDYED BRAIDED (POLYGLACTIN 910), SYNTHETIC ABSORBABLE SUTURE: Brand: COATED VICRYL

## (undated) DEVICE — WEBRIL 6 IN UNSTERILE

## (undated) DEVICE — PACK UNIVERSAL DRAPES SUB-Q ICD

## (undated) DEVICE — SUT VICRYL PLUS 1 CTX 36 IN VCP371H

## (undated) DEVICE — PROXIMATE SKIN STAPLERS (35 WIDE) CONTAINS 35 STAINLESS STEEL STAPLES (FIXED HEAD): Brand: PROXIMATE

## (undated) DEVICE — ARTHROSCOPY FLOOR MAT

## (undated) DEVICE — INTENDED FOR TISSUE SEPARATION, AND OTHER PROCEDURES THAT REQUIRE A SHARP SURGICAL BLADE TO PUNCTURE OR CUT.: Brand: BARD-PARKER ® CARBON RIB-BACK BLADES

## (undated) DEVICE — DRAPE C-ARM X-RAY

## (undated) DEVICE — GAUZE SPONGES,USP TYPE VII GAUZE, 12 PLY: Brand: CURITY

## (undated) DEVICE — HOOD: Brand: FLYTE

## (undated) DEVICE — BIPOLAR SEALER 23-113-1 AQM 2.3: Brand: AQUAMANTYS™

## (undated) DEVICE — ELECTRODE BLADE E-Z CLEAN 6.5IN -0014

## (undated) DEVICE — INTENDED FOR TISSUE SEPARATION, AND OTHER PROCEDURES THAT REQUIRE A SHARP SURGICAL BLADE TO PUNCTURE OR CUT.: Brand: BARD-PARKER SAFETY BLADES SIZE 10, STERILE

## (undated) DEVICE — CAPIT HIP COP -CERAMIC ON POLY

## (undated) DEVICE — WET SKIN PREP TRAY: Brand: MEDLINE INDUSTRIES, INC.

## (undated) DEVICE — GLOVE INDICATOR PI UNDERGLOVE SZ 8 BLUE

## (undated) DEVICE — GLOVE INDICATOR PI UNDERGLOVE SZ 8.5 BLUE

## (undated) DEVICE — VEST SURGEON DISPOSABLE

## (undated) DEVICE — 3000CC GUARDIAN II: Brand: GUARDIAN

## (undated) DEVICE — GLOVE SRG BIOGEL ORTHOPEDIC 7.5

## (undated) DEVICE — SKIN MARKER DUAL TIP WITH RULER CAP, FLEXIBLE RULER AND LABELS: Brand: DEVON

## (undated) DEVICE — GLOVE SRG BIOGEL 7.5

## (undated) DEVICE — 450 ML BOTTLE OF 0.05% CHLORHEXIDINE GLUCONATE IN 99.95% STERILE WATER FOR IRRIGATION, USP AND APPLICATOR.: Brand: IRRISEPT ANTIMICROBIAL WOUND LAVAGE

## (undated) DEVICE — GLOVE SRG BIOGEL 8.5

## (undated) DEVICE — NEEDLE 18 G X 1 1/2

## (undated) DEVICE — 10FR FRAZIER SUCTION HANDLE: Brand: CARDINAL HEALTH

## (undated) DEVICE — GAMMEX® NON-LATEX SENSITIVE SIZE 7.5, STERILE NEOPRENE POWDER-FREE SURGICAL GLOVE: Brand: GAMMEX

## (undated) DEVICE — ADHESIVE SKIN HIGH VISCOSITY EXOFIN 1ML

## (undated) DEVICE — COBAN 6 IN STERILE

## (undated) DEVICE — TRAY FOLEY 16FR URIMETER SURESTEP

## (undated) DEVICE — THE SIMPULSE SOLO SYSTEM WITH ULTREX RETRACTABLE SPLASH SHIELD TIP: Brand: SIMPULSE SOLO

## (undated) DEVICE — SYRINGE 30ML LL

## (undated) DEVICE — SILVER-COATED ANTIMICROBIAL BARRIER DRESSING: Brand: ACTICOAT FLEX7 4" X 5"

## (undated) DEVICE — PENCIL ELECTROSURG E-Z CLEAN -0035H

## (undated) DEVICE — TOWEL SURG XR DETECT GREEN STRL RFD

## (undated) DEVICE — STRL ALLENTOWN HIP SHOULDER PK: Brand: CARDINAL HEALTH

## (undated) DEVICE — 3M™ TEGADERM™ TRANSPARENT FILM DRESSING FRAME STYLE, 1627, 4 IN X 10 IN (10 CM X 25 CM), 20/CT 4CT/CASE: Brand: 3M™ TEGADERM™

## (undated) DEVICE — DRAPE SHEET X-LG